# Patient Record
Sex: MALE | Race: WHITE | NOT HISPANIC OR LATINO | Employment: FULL TIME | URBAN - METROPOLITAN AREA
[De-identification: names, ages, dates, MRNs, and addresses within clinical notes are randomized per-mention and may not be internally consistent; named-entity substitution may affect disease eponyms.]

---

## 2022-04-26 ENCOUNTER — OFFICE VISIT (OUTPATIENT)
Dept: SLEEP MEDICINE | Facility: CLINIC | Age: 62
End: 2022-04-26
Payer: COMMERCIAL

## 2022-04-26 DIAGNOSIS — G47.33 OSA (OBSTRUCTIVE SLEEP APNEA): Primary | ICD-10-CM

## 2022-04-26 DIAGNOSIS — I42.2 HYPERTROPHIC CARDIOMYOPATHY: ICD-10-CM

## 2022-04-26 PROCEDURE — 1159F PR MEDICATION LIST DOCUMENTED IN MEDICAL RECORD: ICD-10-PCS | Mod: CPTII,95,, | Performed by: INTERNAL MEDICINE

## 2022-04-26 PROCEDURE — 99202 PR OFFICE/OUTPT VISIT, NEW, LEVL II, 15-29 MIN: ICD-10-PCS | Mod: 95,,, | Performed by: INTERNAL MEDICINE

## 2022-04-26 PROCEDURE — 99202 OFFICE O/P NEW SF 15 MIN: CPT | Mod: 95,,, | Performed by: INTERNAL MEDICINE

## 2022-04-26 PROCEDURE — 1159F MED LIST DOCD IN RCRD: CPT | Mod: CPTII,95,, | Performed by: INTERNAL MEDICINE

## 2022-04-26 PROCEDURE — 1160F PR REVIEW ALL MEDS BY PRESCRIBER/CLIN PHARMACIST DOCUMENTED: ICD-10-PCS | Mod: CPTII,95,, | Performed by: INTERNAL MEDICINE

## 2022-04-26 PROCEDURE — 1160F RVW MEDS BY RX/DR IN RCRD: CPT | Mod: CPTII,95,, | Performed by: INTERNAL MEDICINE

## 2022-04-26 RX ORDER — APREMILAST 30 MG/1
TABLET, FILM COATED ORAL 2 TIMES DAILY
COMMUNITY
Start: 2022-03-14 | End: 2022-10-03

## 2022-04-26 RX ORDER — FUROSEMIDE 20 MG/1
TABLET ORAL DAILY PRN
COMMUNITY
Start: 2021-10-01 | End: 2022-09-09 | Stop reason: SDUPTHER

## 2022-04-26 NOTE — PROGRESS NOTES
Subjective:       Patient ID: Clinton Weller is a 61 y.o. male.    Chief Complaint: Sleeping Problem    I had the pleasure of seeing Clinton Weller today, who is a 61 y.o. male that presents with COOPER.    Clinton Weller does not have a CDL.    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    Clinton Weller presents with COOPER that has been going on for 10+ years   I do not have copy of prior sleep studies.   Currently on CPAP machine.   Just got new machine under recall about 2 months ago.   Uses [facility] out of [place], but needs new DME.   Uses CPAP consistently.   I do 1not have copy of prior sleep studies.   Uses [facility].   Thinks current setting 11.5 cm H20.   Previous UPPP/TA    Bedtime when working ranges from 2200 to 2230.   When not working, bedtime ranges from 2200 to 2230.   Sleep latency ranges from 10 to 15 minutes.     Average number of awakenings is 2-3 and return to sleep is quick.   Wake up time when working is 0600 to 0600.   When not working, wake up time is 0600 to 0630.   Patient does feel rested upon awakening.    Clinton Weller consumes approximately 2-3 beverages with caffeine daily.   An average of 1 beverages with alcohol are consumed daily   Medications taken for sleep currently: none  Previous medications taken: none     Clinton Weller does not experience daytime sleepiness.   Naps are taken about 0 times weekly, usually lasting NA to NA minutes.  Clinton currently does operate an automobile.  Clinton Weller does not experience drowsiness when driving.   Patient does not doze off when sedentary.   Clinton Weller does not have auxiliary symptoms of narcolepsy including sleep onset paralysis, hypnagogic hallucinations, sleep attacks and cataplexy  No flowsheet data found.    Clinton Weller has a history of snoring.   Snoring is described as moderate and constant.   Apneic episodes have been noticed during sleep.   A witness to sleep is present.   The patient awakens with mouth dryness.       Clinton Weller does not have symptoms of Restless Legs Syndrome. Nocturnal leg movements have not been noticed.   The patient does not experience sleep related leg cramps.   There is not a history of parasomnia.      Current Outpatient Medications:     apremilast (OTEZLA) 30 mg Tab, , Disp: , Rfl:     furosemide (LASIX) 20 MG tablet, , Disp: , Rfl:      Review of patient's allergies indicates:   Allergen Reactions    Sulfa (sulfonamide antibiotics) Shortness Of Breath    Penicillins Dermatitis and Rash         History reviewed. No pertinent past medical history.    History reviewed. No pertinent surgical history.    History reviewed. No pertinent family history.    Social History     Socioeconomic History    Marital status:    Tobacco Use    Smoking status: Former Smoker    Smokeless tobacco: Never Used           Old medical records.    There were no vitals filed for this visit.           The patient was given open opportunity to ask questions and/or express concerns about treatment plan.   All questions/concerns were discussed.   Driving precautions were provided.     Two patient identifiers used prior to evaluation.    Thank you for referring Clinton Weller for evaluation.             History reviewed. No pertinent past medical history.  History reviewed. No pertinent surgical history.  History reviewed. No pertinent family history.  Social History     Socioeconomic History    Marital status:    Tobacco Use    Smoking status: Former Smoker    Smokeless tobacco: Never Used       Current Outpatient Medications   Medication Sig Dispense Refill    apremilast (OTEZLA) 30 mg Tab       furosemide (LASIX) 20 MG tablet        No current facility-administered medications for this visit.     Review of patient's allergies indicates:   Allergen Reactions    Sulfa (sulfonamide antibiotics) Shortness Of Breath    Penicillins Dermatitis and Rash       Review of Systems    Objective:      There  were no vitals filed for this visit.  Physical Exam    Lab Review: CBC: No results found for: WBC, RBC, HGB, HCT, PLT  BMP: No results found for: GLU, NA, K, CL, CO2, BUN, CREATININE, CALCIUM  Diagnostics Review: Echo: Reviewed     Assessment:       1. Hypertrophic cardiomyopathy        Plan:       Due to listed symptoms, a polysomnogram is recommended and ordered.   Description of procedure given to patient.   If significant Obstructive Sleep Apnea (COOPER) is found during the initial portion of the study, therapy will be initiated with nasal Continuous Positive Airway Pressure (CPAP).   Goals of therapy were discussed, alternative treatments listed and patient agrees to this form of therapy if indicated.   The pathophysiology of COOPER was discussed.   The effects of COOPER on patient's co-morbid conditions and the increased morbidity and/or mortality associated with this condition were reviewed.   The patient was given open opportunity to ask questions and/or express concerns about treatment plan.   All questions/concerns were discussed.   Driving precautions were provided.       Thank you for referring Clinton Wellre for evaluation.       The patient location is: home  The chief complaint leading to consultation is: 11    Visit type: audiovisual    Face to Face time with patient: 11  23 minutes of total time spent on the encounter, which includes face to face time and non-face to face time preparing to see the patient (eg, review of tests), Obtaining and/or reviewing separately obtained history, Documenting clinical information in the electronic or other health record, Independently interpreting results (not separately reported) and communicating results to the patient/family/caregiver, or Care coordination (not separately reported).         Each patient to whom he or she provides medical services by telemedicine is:  (1) informed of the relationship between the physician and patient and the respective role of any  other health care provider with respect to management of the patient; and (2) notified that he or she may decline to receive medical services by telemedicine and may withdraw from such care at any time.    Notes:      PSG 3/2016 showed AHI 86.1  CPAP titration 12/16 showed AHI 0 at 8 cm H20

## 2022-06-06 ENCOUNTER — PATIENT MESSAGE (OUTPATIENT)
Dept: SLEEP MEDICINE | Facility: CLINIC | Age: 62
End: 2022-06-06
Payer: COMMERCIAL

## 2022-06-06 DIAGNOSIS — G47.33 OSA (OBSTRUCTIVE SLEEP APNEA): Primary | ICD-10-CM

## 2022-06-15 ENCOUNTER — PATIENT MESSAGE (OUTPATIENT)
Dept: SLEEP MEDICINE | Facility: CLINIC | Age: 62
End: 2022-06-15
Payer: COMMERCIAL

## 2022-07-01 ENCOUNTER — OFFICE VISIT (OUTPATIENT)
Dept: TRANSPLANT | Facility: CLINIC | Age: 62
End: 2022-07-01
Payer: COMMERCIAL

## 2022-07-01 VITALS
HEART RATE: 75 BPM | BODY MASS INDEX: 26.42 KG/M2 | HEIGHT: 75 IN | DIASTOLIC BLOOD PRESSURE: 69 MMHG | SYSTOLIC BLOOD PRESSURE: 136 MMHG | WEIGHT: 212.5 LBS

## 2022-07-01 DIAGNOSIS — I42.2 HYPERTROPHIC CARDIOMYOPATHY: Primary | ICD-10-CM

## 2022-07-01 PROCEDURE — 3078F DIAST BP <80 MM HG: CPT | Mod: CPTII,S$GLB,, | Performed by: INTERNAL MEDICINE

## 2022-07-01 PROCEDURE — 99999 PR PBB SHADOW E&M-EST. PATIENT-LVL III: ICD-10-PCS | Mod: PBBFAC,,, | Performed by: INTERNAL MEDICINE

## 2022-07-01 PROCEDURE — 99499 UNLISTED E&M SERVICE: CPT | Mod: S$GLB,,, | Performed by: INTERNAL MEDICINE

## 2022-07-01 PROCEDURE — 99999 PR PBB SHADOW E&M-EST. PATIENT-LVL III: CPT | Mod: PBBFAC,,, | Performed by: INTERNAL MEDICINE

## 2022-07-01 PROCEDURE — 3008F PR BODY MASS INDEX (BMI) DOCUMENTED: ICD-10-PCS | Mod: CPTII,S$GLB,, | Performed by: INTERNAL MEDICINE

## 2022-07-01 PROCEDURE — 99499 NO LOS: ICD-10-PCS | Mod: S$GLB,,, | Performed by: INTERNAL MEDICINE

## 2022-07-01 PROCEDURE — 3075F PR MOST RECENT SYSTOLIC BLOOD PRESS GE 130-139MM HG: ICD-10-PCS | Mod: CPTII,S$GLB,, | Performed by: INTERNAL MEDICINE

## 2022-07-01 PROCEDURE — 3008F BODY MASS INDEX DOCD: CPT | Mod: CPTII,S$GLB,, | Performed by: INTERNAL MEDICINE

## 2022-07-01 PROCEDURE — 3078F PR MOST RECENT DIASTOLIC BLOOD PRESSURE < 80 MM HG: ICD-10-PCS | Mod: CPTII,S$GLB,, | Performed by: INTERNAL MEDICINE

## 2022-07-01 PROCEDURE — 3075F SYST BP GE 130 - 139MM HG: CPT | Mod: CPTII,S$GLB,, | Performed by: INTERNAL MEDICINE

## 2022-08-05 ENCOUNTER — OFFICE VISIT (OUTPATIENT)
Dept: DERMATOLOGY | Facility: CLINIC | Age: 62
End: 2022-08-05
Payer: COMMERCIAL

## 2022-08-05 ENCOUNTER — TELEPHONE (OUTPATIENT)
Dept: DERMATOLOGY | Facility: CLINIC | Age: 62
End: 2022-08-05

## 2022-08-05 DIAGNOSIS — Z79.899 LONG-TERM USE OF HIGH-RISK MEDICATION: ICD-10-CM

## 2022-08-05 DIAGNOSIS — L40.0 PSORIASIS VULGARIS: Primary | ICD-10-CM

## 2022-08-05 DIAGNOSIS — L40.50 PSORIATIC ARTHRITIS: ICD-10-CM

## 2022-08-05 PROBLEM — Z15.89 HETEROZYGOUS MTHFR MUTATION A1298C: Status: ACTIVE | Noted: 2019-09-18

## 2022-08-05 PROBLEM — R79.89 ELEVATED HOMOCYSTEINE: Status: ACTIVE | Noted: 2019-09-18

## 2022-08-05 PROBLEM — E88.89 COENZYME Q DEFICIENCY: Status: ACTIVE | Noted: 2019-09-18

## 2022-08-05 PROCEDURE — 1160F RVW MEDS BY RX/DR IN RCRD: CPT | Mod: CPTII,95,, | Performed by: DERMATOLOGY

## 2022-08-05 PROCEDURE — 1159F MED LIST DOCD IN RCRD: CPT | Mod: CPTII,95,, | Performed by: DERMATOLOGY

## 2022-08-05 PROCEDURE — 99204 PR OFFICE/OUTPT VISIT, NEW, LEVL IV, 45-59 MIN: ICD-10-PCS | Mod: 95,,, | Performed by: DERMATOLOGY

## 2022-08-05 PROCEDURE — 1159F PR MEDICATION LIST DOCUMENTED IN MEDICAL RECORD: ICD-10-PCS | Mod: CPTII,95,, | Performed by: DERMATOLOGY

## 2022-08-05 PROCEDURE — 1160F PR REVIEW ALL MEDS BY PRESCRIBER/CLIN PHARMACIST DOCUMENTED: ICD-10-PCS | Mod: CPTII,95,, | Performed by: DERMATOLOGY

## 2022-08-05 PROCEDURE — 99204 OFFICE O/P NEW MOD 45 MIN: CPT | Mod: 95,,, | Performed by: DERMATOLOGY

## 2022-08-05 RX ORDER — APREMILAST 30 MG/1
TABLET, FILM COATED ORAL
Qty: 60 TABLET | Refills: 2 | Status: SHIPPED | OUTPATIENT
Start: 2022-08-05 | End: 2022-09-08

## 2022-08-05 RX ORDER — TAMSULOSIN HYDROCHLORIDE 0.4 MG/1
1 CAPSULE ORAL DAILY
COMMUNITY
Start: 2022-04-11 | End: 2022-09-08

## 2022-08-05 RX ORDER — CLOBETASOL PROPIONATE 0.5 MG/G
CREAM TOPICAL
COMMUNITY
Start: 2022-03-14

## 2022-08-05 NOTE — TELEPHONE ENCOUNTER
----- Message from Marilin Rodriguez MD sent at 8/5/2022  8:32 AM CDT -----  Please call pt to schedule labs (quant gold too)

## 2022-08-05 NOTE — PROGRESS NOTES
"The patient location is: home  The chief complaint leading to consultation is: psoriasis  Visit type: virtual visit with synchronous audio and video  Total time spent with patient: 18 minutes  Each patient to whom I provide medical services by telemedicine is:  (1) informed of the relationship between the physician and patient and the respective role of any other health care provider with respect to management of the patient; and (2) notified that he or she may decline to receive medical services by telemedicine and may withdraw from such care at any time.    Patient Information  Name: Clinton Weller  : 1960  MRN: [id number]     Referring Physician:  No ref. provider found   Primary Care Physician:  Primary Doctor No   Date of Visit: 2022      Subjective:     History of Present lllness:    Clinton Weller is a 62 y.o. male who presents with a chief complaint of psoriasis.    Location: scalp, forehead, right shoulder, left shoulder, left arm, right arm, chest, left leg, right leg  Duration: several years (since )  Signs/Symptoms: crusting, dryness, irritated, non-healing, scabs, scaling, joint pain  Relieving factors/Prior treatments: No relief from Rx topical steroids; has been on Otezla for 3 months--"huge improvement"    Clinical documentation obtained by nursing staff reviewed.    Review of Systems   Gastrointestinal: Negative for nausea, vomiting and diarrhea.   Musculoskeletal: Positive for arthralgias (joints feel a little stiff in the a.m., clears up with light movement). Negative for joint swelling.   Skin: Positive for itching and rash.   Psychiatric/Behavioral: Negative for depressed mood.       Objective:   Physical Exam   Constitutional: He appears well-developed and well-nourished. No distress.   Neurological: He is alert and oriented to person, place, and time. He is not disoriented.   Psychiatric: He has a normal mood and affect.   Skin:   Areas Examined (abnormalities noted in " diagram):   Head / Face Inspection Performed  Neck Inspection Performed  Chest / Axilla Inspection Performed       Diagram Legend     Erythematous scaling macule/papule c/w actinic keratosis       Vascular papule c/w angioma      Pigmented verrucoid papule/plaque c/w seborrheic keratosis      Yellow umbilicated papule c/w sebaceous hyperplasia      Irregularly shaped tan macule c/w lentigo     1-2 mm smooth white papules consistent with Milia      Movable subcutaneous cyst with punctum c/w epidermal inclusion cyst      Subcutaneous movable cyst c/w pilar cyst      Firm pink to brown papule c/w dermatofibroma      Pedunculated fleshy papule(s) c/w skin tag(s)      Evenly pigmented macule c/w junctional nevus     Mildly variegated pigmented, slightly irregular-bordered macule c/w mildly atypical nevus      Flesh colored to evenly pigmented papule c/w intradermal nevus       Pink pearly papule/plaque c/w basal cell carcinoma      Erythematous hyperkeratotic cursted plaque c/w SCC      Surgical scar with no sign of skin cancer recurrence      Open and closed comedones      Inflammatory papules and pustules      Verrucoid papule consistent consistent with wart     Erythematous eczematous patches and plaques     Dystrophic onycholytic nail with subungual debris c/w onychomycosis     Umbilicated papule    Erythematous-base heme-crusted tan verrucoid plaque consistent with inflamed seborrheic keratosis     Erythematous Silvery Scaling Plaque c/w Psoriasis     See annotation          [] Data reviewed  [] Prior external notes reviewed  [] Independent review of test  [] Management discussed with another provider  [] Independent historian    Assessment / Plan:        Psoriasis vulgaris  Long-term use of high-risk medication   - stable and chronic  Risks and benefits of Otezla include but are not limited to nausea, diarrhea, headaches, tension headaches, weight loss, depression, and upper respiratory infections.   Otezla can be  taken with or without food.   Tablets should not be split, crushed, or chewed.   Continue dosage of 30 mg po bid.   If there are any changes, patient is to notify our office.     Check labs:  -     CBC Auto Differential; Future; Expected date: 08/05/2022  -     Comprehensive Metabolic Panel; Future; Expected date: 08/05/2022  -     Quantiferon Gold TB; Future; Expected date: 08/05/2022  -     Rheumatoid Factor; Future; Expected date: 08/05/2022    -     OTEZLA 30 mg Tab; Take 1 po bid  Dispense: 60 tablet; Refill: 2    Psoriatic arthritis   - stable and chronic  -     CBC Auto Differential; Future; Expected date: 08/05/2022  -     Comprehensive Metabolic Panel; Future; Expected date: 08/05/2022  -     Quantiferon Gold TB; Future; Expected date: 08/05/2022  -     Rheumatoid Factor; Future; Expected date: 08/05/2022  -     OTEZLA 30 mg Tab; Take 1 po bid  Dispense: 60 tablet; Refill: 2      Follow up in about 3 months (around 11/5/2022).      Marilin Rodriguez MD, FAAD  [facility]

## 2022-08-29 ENCOUNTER — LAB VISIT (OUTPATIENT)
Dept: LAB | Facility: HOSPITAL | Age: 62
End: 2022-08-29
Attending: DERMATOLOGY
Payer: COMMERCIAL

## 2022-08-29 DIAGNOSIS — Z79.899 LONG-TERM USE OF HIGH-RISK MEDICATION: ICD-10-CM

## 2022-08-29 DIAGNOSIS — L40.0 PSORIASIS VULGARIS: ICD-10-CM

## 2022-08-29 LAB
ALBUMIN SERPL BCP-MCNC: 4.6 G/DL (ref 3.5–5.2)
ALP SERPL-CCNC: 65 U/L (ref 55–135)
ALT SERPL W/O P-5'-P-CCNC: 40 U/L (ref 10–44)
ANION GAP SERPL CALC-SCNC: 14 MMOL/L (ref 8–16)
AST SERPL-CCNC: 43 U/L (ref 10–40)
BASOPHILS # BLD AUTO: 0.03 K/UL (ref 0–0.2)
BASOPHILS NFR BLD: 0.5 % (ref 0–1.9)
BILIRUB SERPL-MCNC: 3.3 MG/DL (ref 0.1–1)
BUN SERPL-MCNC: 12 MG/DL (ref 6–20)
CALCIUM SERPL-MCNC: 10.1 MG/DL (ref 8.7–10.5)
CHLORIDE SERPL-SCNC: 106 MMOL/L (ref 95–110)
CO2 SERPL-SCNC: 22 MMOL/L (ref 23–29)
CREAT SERPL-MCNC: 1.1 MG/DL (ref 0.5–1.4)
DIFFERENTIAL METHOD: ABNORMAL
EOSINOPHIL # BLD AUTO: 0.2 K/UL (ref 0–0.5)
EOSINOPHIL NFR BLD: 3.7 % (ref 0–8)
ERYTHROCYTE [DISTWIDTH] IN BLOOD BY AUTOMATED COUNT: 12.3 % (ref 11.5–14.5)
EST. GFR  (NO RACE VARIABLE): >60 ML/MIN/1.73 M^2
GLUCOSE SERPL-MCNC: 95 MG/DL (ref 70–110)
HCT VFR BLD AUTO: 51.7 % (ref 40–54)
HGB BLD-MCNC: 17.5 G/DL (ref 14–18)
IMM GRANULOCYTES # BLD AUTO: 0.01 K/UL (ref 0–0.04)
IMM GRANULOCYTES NFR BLD AUTO: 0.2 % (ref 0–0.5)
LYMPHOCYTES # BLD AUTO: 1.8 K/UL (ref 1–4.8)
LYMPHOCYTES NFR BLD: 29.9 % (ref 18–48)
MCH RBC QN AUTO: 31.4 PG (ref 27–31)
MCHC RBC AUTO-ENTMCNC: 33.8 G/DL (ref 32–36)
MCV RBC AUTO: 93 FL (ref 82–98)
MONOCYTES # BLD AUTO: 0.3 K/UL (ref 0.3–1)
MONOCYTES NFR BLD: 4.9 % (ref 4–15)
NEUTROPHILS # BLD AUTO: 3.6 K/UL (ref 1.8–7.7)
NEUTROPHILS NFR BLD: 60.8 % (ref 38–73)
NRBC BLD-RTO: 0 /100 WBC
PLATELET # BLD AUTO: 202 K/UL (ref 150–450)
PMV BLD AUTO: 11.4 FL (ref 9.2–12.9)
POTASSIUM SERPL-SCNC: 3.9 MMOL/L (ref 3.5–5.1)
PROT SERPL-MCNC: 7.7 G/DL (ref 6–8.4)
RBC # BLD AUTO: 5.58 M/UL (ref 4.6–6.2)
RHEUMATOID FACT SERPL-ACNC: <13 IU/ML (ref 0–15)
SODIUM SERPL-SCNC: 142 MMOL/L (ref 136–145)
WBC # BLD AUTO: 5.88 K/UL (ref 3.9–12.7)

## 2022-08-29 PROCEDURE — 36415 COLL VENOUS BLD VENIPUNCTURE: CPT | Mod: PO | Performed by: DERMATOLOGY

## 2022-08-29 PROCEDURE — 85025 COMPLETE CBC W/AUTO DIFF WBC: CPT | Performed by: DERMATOLOGY

## 2022-08-29 PROCEDURE — 86431 RHEUMATOID FACTOR QUANT: CPT | Performed by: DERMATOLOGY

## 2022-08-29 PROCEDURE — 80053 COMPREHEN METABOLIC PANEL: CPT | Performed by: DERMATOLOGY

## 2022-08-29 PROCEDURE — 86480 TB TEST CELL IMMUN MEASURE: CPT | Performed by: DERMATOLOGY

## 2022-08-31 LAB
GAMMA INTERFERON BACKGROUND BLD IA-ACNC: 0 IU/ML
M TB IFN-G CD4+ BCKGRND COR BLD-ACNC: 0.04 IU/ML
MITOGEN IGNF BCKGRD COR BLD-ACNC: 10 IU/ML
TB GOLD PLUS: NEGATIVE
TB2 - NIL: 0.05 IU/ML

## 2022-09-07 ENCOUNTER — OFFICE VISIT (OUTPATIENT)
Dept: FAMILY MEDICINE | Facility: CLINIC | Age: 62
End: 2022-09-07
Attending: FAMILY MEDICINE
Payer: COMMERCIAL

## 2022-09-07 VITALS — BODY MASS INDEX: 26.79 KG/M2 | WEIGHT: 215.5 LBS | RESPIRATION RATE: 16 BRPM | HEIGHT: 75 IN

## 2022-09-07 DIAGNOSIS — Z00.00 ANNUAL PHYSICAL EXAM: Primary | ICD-10-CM

## 2022-09-07 DIAGNOSIS — I42.2 HYPERTROPHIC CARDIOMYOPATHY: ICD-10-CM

## 2022-09-07 DIAGNOSIS — I10 ESSENTIAL HYPERTENSION: ICD-10-CM

## 2022-09-07 DIAGNOSIS — Z12.11 SCREEN FOR COLON CANCER: ICD-10-CM

## 2022-09-07 LAB
BILIRUB UR QL STRIP: NEGATIVE
CLARITY UR REFRACT.AUTO: CLEAR
COLOR UR AUTO: YELLOW
GLUCOSE UR QL STRIP: NEGATIVE
HGB UR QL STRIP: NEGATIVE
KETONES UR QL STRIP: NEGATIVE
LEUKOCYTE ESTERASE UR QL STRIP: NEGATIVE
NITRITE UR QL STRIP: NEGATIVE
PH UR STRIP: 7 [PH] (ref 5–8)
PROT UR QL STRIP: NEGATIVE
SP GR UR STRIP: 1.01 (ref 1–1.03)
URN SPEC COLLECT METH UR: NORMAL

## 2022-09-07 PROCEDURE — 90471 TDAP VACCINE GREATER THAN OR EQUAL TO 7YO IM: ICD-10-PCS | Mod: S$GLB,,, | Performed by: FAMILY MEDICINE

## 2022-09-07 PROCEDURE — 90715 TDAP VACCINE 7 YRS/> IM: CPT | Mod: S$GLB,,, | Performed by: FAMILY MEDICINE

## 2022-09-07 PROCEDURE — 99386 PREV VISIT NEW AGE 40-64: CPT | Mod: 25,S$GLB,, | Performed by: FAMILY MEDICINE

## 2022-09-07 PROCEDURE — 90471 IMMUNIZATION ADMIN: CPT | Mod: S$GLB,,, | Performed by: FAMILY MEDICINE

## 2022-09-07 PROCEDURE — 3008F BODY MASS INDEX DOCD: CPT | Mod: CPTII,S$GLB,, | Performed by: FAMILY MEDICINE

## 2022-09-07 PROCEDURE — 81003 URINALYSIS AUTO W/O SCOPE: CPT | Performed by: FAMILY MEDICINE

## 2022-09-07 PROCEDURE — 90715 TDAP VACCINE GREATER THAN OR EQUAL TO 7YO IM: ICD-10-PCS | Mod: S$GLB,,, | Performed by: FAMILY MEDICINE

## 2022-09-07 PROCEDURE — 99386 PR PREVENTIVE VISIT,NEW,40-64: ICD-10-PCS | Mod: 25,S$GLB,, | Performed by: FAMILY MEDICINE

## 2022-09-07 PROCEDURE — 99999 PR PBB SHADOW E&M-EST. PATIENT-LVL III: CPT | Mod: PBBFAC,,, | Performed by: FAMILY MEDICINE

## 2022-09-07 PROCEDURE — 99999 PR PBB SHADOW E&M-EST. PATIENT-LVL III: ICD-10-PCS | Mod: PBBFAC,,, | Performed by: FAMILY MEDICINE

## 2022-09-07 PROCEDURE — 3008F PR BODY MASS INDEX (BMI) DOCUMENTED: ICD-10-PCS | Mod: CPTII,S$GLB,, | Performed by: FAMILY MEDICINE

## 2022-09-07 NOTE — PATIENT INSTRUCTIONS
Clinton,     We are always striving for excellence. Should you receive a patient experience survey via text message, electronically, or by mail, we would appreciate if you would take a few moments to give us your feedback. These surveys let us know our strengths as well as areas of opportunity for improvement to better serve you.    Thank you for your time,  Deepika Rajput LPN      Your test results will be communicated to you via : [facility], Telephone or Letter.   If you have not received your test results in one week, please contact the clinic at [phone].

## 2022-09-07 NOTE — PROGRESS NOTES
Two patient identifiers verified. Allergies reviewed.  Tdap IM administered to Left deltoid per MD order. Patient tolerated injection well: no redness, bleeding, or bruising noted to injection site. Patient instructed to remain in clinic setting for 15 minutes.

## 2022-09-08 ENCOUNTER — CLINICAL SUPPORT (OUTPATIENT)
Dept: AUDIOLOGY | Facility: CLINIC | Age: 62
End: 2022-09-08
Payer: COMMERCIAL

## 2022-09-08 ENCOUNTER — OFFICE VISIT (OUTPATIENT)
Dept: OTOLARYNGOLOGY | Facility: CLINIC | Age: 62
End: 2022-09-08
Payer: COMMERCIAL

## 2022-09-08 VITALS — HEART RATE: 55 BPM | SYSTOLIC BLOOD PRESSURE: 125 MMHG | DIASTOLIC BLOOD PRESSURE: 80 MMHG

## 2022-09-08 DIAGNOSIS — H90.3 SENSORINEURAL HEARING LOSS (SNHL) OF BOTH EARS: ICD-10-CM

## 2022-09-08 DIAGNOSIS — H90.3 SENSORINEURAL HEARING LOSS, BILATERAL: Primary | ICD-10-CM

## 2022-09-08 DIAGNOSIS — H93.13 TINNITUS OF BOTH EARS: Primary | ICD-10-CM

## 2022-09-08 DIAGNOSIS — H93.13 TINNITUS, BILATERAL: ICD-10-CM

## 2022-09-08 PROCEDURE — 3079F DIAST BP 80-89 MM HG: CPT | Mod: CPTII,S$GLB,, | Performed by: NURSE PRACTITIONER

## 2022-09-08 PROCEDURE — 92567 TYMPANOMETRY: CPT | Mod: S$GLB,,, | Performed by: AUDIOLOGIST

## 2022-09-08 PROCEDURE — 99999 PR PBB SHADOW E&M-EST. PATIENT-LVL II: CPT | Mod: PBBFAC,,, | Performed by: NURSE PRACTITIONER

## 2022-09-08 PROCEDURE — 3074F PR MOST RECENT SYSTOLIC BLOOD PRESSURE < 130 MM HG: ICD-10-PCS | Mod: CPTII,S$GLB,, | Performed by: NURSE PRACTITIONER

## 2022-09-08 PROCEDURE — 99999 PR PBB SHADOW E&M-EST. PATIENT-LVL I: CPT | Mod: PBBFAC,,, | Performed by: AUDIOLOGIST

## 2022-09-08 PROCEDURE — 99203 OFFICE O/P NEW LOW 30 MIN: CPT | Mod: S$GLB,,, | Performed by: NURSE PRACTITIONER

## 2022-09-08 PROCEDURE — 92557 PR COMPREHENSIVE HEARING TEST: ICD-10-PCS | Mod: S$GLB,,, | Performed by: AUDIOLOGIST

## 2022-09-08 PROCEDURE — 3079F PR MOST RECENT DIASTOLIC BLOOD PRESSURE 80-89 MM HG: ICD-10-PCS | Mod: CPTII,S$GLB,, | Performed by: NURSE PRACTITIONER

## 2022-09-08 PROCEDURE — 1159F MED LIST DOCD IN RCRD: CPT | Mod: CPTII,S$GLB,, | Performed by: NURSE PRACTITIONER

## 2022-09-08 PROCEDURE — 3074F SYST BP LT 130 MM HG: CPT | Mod: CPTII,S$GLB,, | Performed by: NURSE PRACTITIONER

## 2022-09-08 PROCEDURE — 99203 PR OFFICE/OUTPT VISIT, NEW, LEVL III, 30-44 MIN: ICD-10-PCS | Mod: S$GLB,,, | Performed by: NURSE PRACTITIONER

## 2022-09-08 PROCEDURE — 99999 PR PBB SHADOW E&M-EST. PATIENT-LVL I: ICD-10-PCS | Mod: PBBFAC,,, | Performed by: AUDIOLOGIST

## 2022-09-08 PROCEDURE — 92567 PR TYMPA2METRY: ICD-10-PCS | Mod: S$GLB,,, | Performed by: AUDIOLOGIST

## 2022-09-08 PROCEDURE — 1159F PR MEDICATION LIST DOCUMENTED IN MEDICAL RECORD: ICD-10-PCS | Mod: CPTII,S$GLB,, | Performed by: NURSE PRACTITIONER

## 2022-09-08 PROCEDURE — 92557 COMPREHENSIVE HEARING TEST: CPT | Mod: S$GLB,,, | Performed by: AUDIOLOGIST

## 2022-09-08 PROCEDURE — 99999 PR PBB SHADOW E&M-EST. PATIENT-LVL II: ICD-10-PCS | Mod: PBBFAC,,, | Performed by: NURSE PRACTITIONER

## 2022-09-08 NOTE — PROGRESS NOTES
Clinton Weller, a 62 y.o. male, was seen today in the clinic for an audiologic evaluation.  Patients main complaint was tinnitus.  Mr. Weller reported that he is experiencing bilateral tinnitus that is constant in nature and has been present for several years.  He denied otalgia, aural fullness, and vertigo.    Tympanometry revealed Type A in the right ear and Type A in the left ear. Audiogram results revealed normal sloping to severe sensorineural hearing loss (SNHL) in the right ear and normal sloping to severe SNHL in the left ear.  Speech reception thresholds were noted at 15 dB in the right ear and 15 dB in the left ear.  Speech discrimination scores were 100% in the right ear and 100% in the left ear.    Recommendations:  Otologic evaluation  Annual audiogram  Hearing protection when in noise

## 2022-09-08 NOTE — PROGRESS NOTES
Subjective:      Clinton Weller is a 62 y.o. male who was self-referred for  hearing loss and tinnitus .     Mr. Weller presents to clinic for a hearing evaluation.  He reports a long history of bilateral, constant tonal tinnitus.  He notes that in 2016 he went into cardiac arrest and was shocked by his defibrillator.  After that event the tinnitus became distinctly worse.  His cardiologist suspects this was do to decreased oxygenation.  He reports that his hearing seems decent, although he struggles in loud areas such as restaurants.  He denies any otalgia, otorrhea, ear fullness/pressure, or vertigo.  He denies any ASA/NSAID use.  Minimal coffee consumption.  Does not smokes and denies migraines or recent head trauma.     There is not a family history of hearing loss at a young age.  There is not a prior history of ear surgery.  There is not a prior history of ear infections.  There is not a history of ear trauma.  He admits to a history of significant noise exposure- played in a band, used firearms, and worked around farm machinery.  He does not wear hearing aids currently.  He has not had a hearing test recently.      Past Medical History  He has no past medical history on file.    Past Surgical History  He has no past surgical history on file.    Family History  His family history is not on file.    Social History  He reports that he has quit smoking. He has never used smokeless tobacco.    Allergies  He is allergic to penicillins and sulfa (sulfonamide antibiotics).    Medications  He has a current medication list which includes the following prescription(s): otezla, clobetasol, furosemide, otezla, and tamsulosin.    Review of Systems     Constitutional: Negative for appetite change, chills, fatigue, fever and unexpected weight loss.      HENT: Positive for ear discharge, hearing loss and voice change.      Eyes:  Negative for change in eyesight, eye drainage, eye itching and photophobia.      Respiratory:  Positive for shortness of breath and sleep apnea.      Cardiovascular:  Positive for chest pain, foot swelling and irregular heartbeat.     Gastrointestinal:  Positive for constipation.     Genitourinary: Negative for difficulty urinating, sexual problems and frequent urination.     Musc: Positive for back pain.     Skin: Positive for rash.     Allergy: Negative for food allergies and seasonal allergies.     Endocrine: Negative for cold intolerance and heat intolerance.      Neurological: Positive for dizziness and light-headedness.     Hematologic: Negative for bruises/bleeds easily and swollen glands.      Psychiatric: Negative for decreased concentration, depression, nervous/anxious and sleep disturbance.        Objective:   /80   Pulse (!) 55      Constitutional:   He is oriented to person, place, and time. He appears well-developed and well-nourished. He appears alert. He is cooperative.  Non-toxic appearance. He does not have a sickly appearance. He does not appear ill. Normal speech.      Head:  Normocephalic and atraumatic. Not macrocephalic and not microcephalic. Head is without raccoon's eyes, without Alvarez's sign, without abrasion, without laceration, without right periorbital erythema, without left periorbital erythema and without TMJ tenderness.     Ears:    Right Ear: No lacerations. No drainage, swelling or tenderness. No foreign bodies. No mastoid tenderness. Tympanic membrane is not injected, not scarred, not perforated, not erythematous, not retracted and not bulging. No middle ear effusion. No hemotympanum.   Left Ear: No lacerations. No drainage, swelling or tenderness. No foreign bodies. No mastoid tenderness. Tympanic membrane is not injected, not scarred, not perforated, not erythematous, not retracted and not bulging.  No middle ear effusion. No hemotympanum.     Pulmonary/Chest:   Effort normal.     Psychiatric:   He has a normal mood and affect. His speech is  normal and behavior is normal.     Neurological:   He is alert and oriented to person, place, and time. Coordination and gait normal.   Procedure  None    Data Reviewed  WBC (K/uL)   Date Value   08/29/2022 5.88     Eosinophil % (%)   Date Value   08/29/2022 3.7     Eos # (K/uL)   Date Value   08/29/2022 0.2     Platelets (K/uL)   Date Value   08/29/2022 202     Glucose (mg/dL)   Date Value   08/29/2022 95     No results found for: IGE    Imaging  No pertinent imaging available.    Audiogram    I independently reviewed the tracings of the complete audiometric evaluation performed today.  I reviewed the audiogram with the patient as well.  Pertinent findings include binaural sloping HF sensorineural hearing loss with normal tymps.  Assessment:     1. Tinnitus of both ears    2. Sensorineural hearing loss (SNHL) of both ears      Plan:     Tinnitus of both ears  Discussed the etiology of tinnitus and management strategies including the use of background sound enrichment. He was further instructed that avoidance of caffeine, alcohol, tobacco, and stress can be of benefit.  Reassurance was given to the patient.      Sensorineural hearing loss (SNHL) of both ears  Audiometric testing interpretation consistent with sensorineural hearing loss.  Discussed the etiology of SNHL.  At this time HA are not indicated, but will monitor hearing annually.  Hearing conservation in noisy environments.  Return to clinic every year for audiometric testing.

## 2022-09-09 ENCOUNTER — HOSPITAL ENCOUNTER (OUTPATIENT)
Dept: CARDIOLOGY | Facility: HOSPITAL | Age: 62
Discharge: HOME | End: 2022-09-09
Attending: INTERNAL MEDICINE
Payer: COMMERCIAL

## 2022-09-09 ENCOUNTER — OFFICE VISIT (OUTPATIENT)
Dept: TRANSPLANT | Facility: CLINIC | Age: 62
End: 2022-09-09
Payer: COMMERCIAL

## 2022-09-09 VITALS
HEIGHT: 75 IN | HEART RATE: 50 BPM | SYSTOLIC BLOOD PRESSURE: 125 MMHG | BODY MASS INDEX: 26.36 KG/M2 | DIASTOLIC BLOOD PRESSURE: 80 MMHG | WEIGHT: 212 LBS

## 2022-09-09 VITALS
WEIGHT: 216.69 LBS | DIASTOLIC BLOOD PRESSURE: 75 MMHG | HEIGHT: 75 IN | BODY MASS INDEX: 26.94 KG/M2 | HEART RATE: 73 BPM | SYSTOLIC BLOOD PRESSURE: 144 MMHG

## 2022-09-09 DIAGNOSIS — I42.1 HYPERTROPHIC OBSTRUCTIVE CARDIOMYOPATHY (HOCM): ICD-10-CM

## 2022-09-09 DIAGNOSIS — I42.2 HYPERTROPHIC CARDIOMYOPATHY: ICD-10-CM

## 2022-09-09 DIAGNOSIS — Z95.810 IMPLANTABLE CARDIOVERTER-DEFIBRILLATOR (ICD) IN SITU: ICD-10-CM

## 2022-09-09 DIAGNOSIS — I42.2 HYPERTROPHIC CARDIOMYOPATHY: Primary | ICD-10-CM

## 2022-09-09 LAB
ASCENDING AORTA: 3.98 CM
AV INDEX (PROSTH): 0.81
AV MEAN GRADIENT: 3 MMHG
AV PEAK GRADIENT: 5 MMHG
AV VALVE AREA: 4 CM2
AV VELOCITY RATIO: 0.85
BSA FOR ECHO PROCEDURE: 2.26 M2
CV ECHO LV RWT: 0.42 CM
DOP CALC AO PEAK VEL: 1.15 M/S
DOP CALC AO VTI: 31.96 CM
DOP CALC LVOT AREA: 4.9 CM2
DOP CALC LVOT DIAMETER: 2.51 CM
DOP CALC LVOT PEAK VEL: 0.98 M/S
DOP CALC LVOT STROKE VOLUME: 127.94 CM3
DOP CALCLVOT PEAK VEL VTI: 25.87 CM
E WAVE DECELERATION TIME: 253.11 MSEC
E/A RATIO: 0.62
E/E' RATIO: 8.73 M/S
ECHO LV POSTERIOR WALL: 1.05 CM (ref 0.6–1.1)
EJECTION FRACTION: 58 %
FRACTIONAL SHORTENING: 22 % (ref 28–44)
INTERVENTRICULAR SEPTUM: 1.05 CM (ref 0.6–1.1)
LA MAJOR: 4.72 CM
LA MINOR: 4.85 CM
LA WIDTH: 3.63 CM
LEFT ATRIUM SIZE: 4 CM
LEFT ATRIUM VOLUME INDEX MOD: 21.9 ML/M2
LEFT ATRIUM VOLUME INDEX: 26.2 ML/M2
LEFT ATRIUM VOLUME MOD: 49.19 CM3
LEFT ATRIUM VOLUME: 59.05 CM3
LEFT INTERNAL DIMENSION IN SYSTOLE: 3.92 CM (ref 2.1–4)
LEFT VENTRICLE DIASTOLIC VOLUME INDEX: 52.55 ML/M2
LEFT VENTRICLE DIASTOLIC VOLUME: 118.23 ML
LEFT VENTRICLE MASS INDEX: 86 G/M2
LEFT VENTRICLE SYSTOLIC VOLUME INDEX: 29.7 ML/M2
LEFT VENTRICLE SYSTOLIC VOLUME: 66.9 ML
LEFT VENTRICULAR INTERNAL DIMENSION IN DIASTOLE: 5 CM (ref 3.5–6)
LEFT VENTRICULAR MASS: 194.38 G
LV LATERAL E/E' RATIO: 8 M/S
LV SEPTAL E/E' RATIO: 9.6 M/S
MV A" WAVE DURATION": 17.89 MSEC
MV PEAK A VEL: 0.77 M/S
MV PEAK E VEL: 0.48 M/S
MV STENOSIS PRESSURE HALF TIME: 73.4 MS
MV VALVE AREA P 1/2 METHOD: 3 CM2
PISA TR MAX VEL: 2.16 M/S
PULM VEIN S/D RATIO: 1.13
PV PEAK D VEL: 0.32 M/S
PV PEAK S VEL: 0.36 M/S
QEF: 61 %
RA MAJOR: 4.53 CM
RA WIDTH: 3.57 CM
RIGHT VENTRICULAR END-DIASTOLIC DIMENSION: 3.73 CM
RV TISSUE DOPPLER FREE WALL SYSTOLIC VELOCITY 1 (APICAL 4 CHAMBER VIEW): 12.15 CM/S
SINUS: 3.88 CM
STJ: 3.68 CM
TDI LATERAL: 0.06 M/S
TDI SEPTAL: 0.05 M/S
TDI: 0.06 M/S
TR MAX PG: 19 MMHG
TRICUSPID ANNULAR PLANE SYSTOLIC EXCURSION: 2.02 CM

## 2022-09-09 PROCEDURE — 3078F PR MOST RECENT DIASTOLIC BLOOD PRESSURE < 80 MM HG: ICD-10-PCS | Mod: CPTII,S$GLB,, | Performed by: INTERNAL MEDICINE

## 2022-09-09 PROCEDURE — 99204 OFFICE O/P NEW MOD 45 MIN: CPT | Mod: S$GLB,,, | Performed by: INTERNAL MEDICINE

## 2022-09-09 PROCEDURE — 99999 PR PBB SHADOW E&M-EST. PATIENT-LVL III: CPT | Mod: PBBFAC,,, | Performed by: INTERNAL MEDICINE

## 2022-09-09 PROCEDURE — 3008F BODY MASS INDEX DOCD: CPT | Mod: CPTII,S$GLB,, | Performed by: INTERNAL MEDICINE

## 2022-09-09 PROCEDURE — 93306 TTE W/DOPPLER COMPLETE: CPT

## 2022-09-09 PROCEDURE — 99204 PR OFFICE/OUTPT VISIT, NEW, LEVL IV, 45-59 MIN: ICD-10-PCS | Mod: S$GLB,,, | Performed by: INTERNAL MEDICINE

## 2022-09-09 PROCEDURE — 99999 PR PBB SHADOW E&M-EST. PATIENT-LVL III: ICD-10-PCS | Mod: PBBFAC,,, | Performed by: INTERNAL MEDICINE

## 2022-09-09 PROCEDURE — 3077F SYST BP >= 140 MM HG: CPT | Mod: CPTII,S$GLB,, | Performed by: INTERNAL MEDICINE

## 2022-09-09 PROCEDURE — 93306 ECHO (CUPID ONLY): ICD-10-PCS | Mod: 26,,, | Performed by: INTERNAL MEDICINE

## 2022-09-09 PROCEDURE — 3077F PR MOST RECENT SYSTOLIC BLOOD PRESSURE >= 140 MM HG: ICD-10-PCS | Mod: CPTII,S$GLB,, | Performed by: INTERNAL MEDICINE

## 2022-09-09 PROCEDURE — 93306 TTE W/DOPPLER COMPLETE: CPT | Mod: 26,,, | Performed by: INTERNAL MEDICINE

## 2022-09-09 PROCEDURE — 3008F PR BODY MASS INDEX (BMI) DOCUMENTED: ICD-10-PCS | Mod: CPTII,S$GLB,, | Performed by: INTERNAL MEDICINE

## 2022-09-09 PROCEDURE — 3078F DIAST BP <80 MM HG: CPT | Mod: CPTII,S$GLB,, | Performed by: INTERNAL MEDICINE

## 2022-09-09 RX ORDER — FUROSEMIDE 20 MG/1
20 TABLET ORAL DAILY PRN
Qty: 30 TABLET | Refills: 6 | Status: SHIPPED | OUTPATIENT
Start: 2022-09-09 | End: 2023-03-16

## 2022-09-09 NOTE — PATIENT INSTRUCTIONS
You have just the right amount of fluid on you.  Please adhere to a low sodium diet (no more than 1.5 grams of sodium in 24h).  3.   Follow fluid restriction of  1. no more than 2 liters in 24 hours..   4.   Please have holter monitoring set up at your earliest convenience.  5. Follow up in 6 months with labs

## 2022-09-10 NOTE — PROGRESS NOTES
"                                                                                       [facility] Follow up.      Attending Physician: Bubba Osorio MD.  The patient's last visit with me was on 7/1/2022.         HPI.  62 Y male with history of hypertrophic cardiomyopathy, s/p ETOH ablation x 2, s/p VT ablation x2, PPM/ICD and in 2016 two failed shocks, rescued on the third shock for VT/VF.      Patient comes to establish care as he moved recently to [place] area from [place], [place].  He was previously being taken care of at [facility], and before that at [facility] (Alexander Andrew MD / Jesus Matias MD / Clinton Bonilla MD)     HCM Morphology:  Concentric hypertrophy     Risk Stratification:  WT > 3: No  Syncope: Yes  VT: Yes  FH SCD: No  Lack of BP rise with exercise: unknown at this time.   DGE on cMRI: Unknown      History of present illness   Healthy and active childhood but h/o difficulty with exertion and distance, +murmur  2005 diagnosed with HOCM   2005 ETOH ablation at [facility], has a decline in his usually exercise. No medication trial prior.   2006 repeat ETOH ablation, syncope d/t complete AVB and PPM/ICD placed   2008 shock inappropriate for SVT   [id number] felt "great", able to return to his usually moderate-high intensity exercise regimen.   2014 generator change, increase symptom burden, AVNRT ablation     July 2016 - started CCB d/t increase symptoms and soon after had an increase burden of NSVT (patient's report), and then three shocks, first 2 failed and the third rescued, VT/VF. sustained VT of 214 bpm. ATP was not effective an accelerated the VT. First two shocks were not effective. Final shock changed the VT enough for it to terminate. Cath showed no significant CAD. ECHO showed no LVOT gradient. Underwent VT ablation on 7/12/2016. Frequent PVCs localized to the LV septum from a discrete scarred region consistent with his ablated septum. Complete " elimination of PVCs with homogenization of the scar. Highly fractioned signal along the right bundle and the left posterior fascicle was mapped; to prevent the likely occurrence of BBRT or interfascicular VT, the regions of fragmentation were ablated and a true RBBB with LPFB was created. Decreased bipolar sensing from the RV septal ICD lead was observed after ablations. DFT testing was then performed on 7/13/2016. RV lead sensing configuration was changed from bipolar to extended bipolar and appropriate sensing was confirmed during VF induction. DFT was confirmed to be 20 J or less with B>AX configuration with the SVC included in the circuit.     1/2018: First visit with Dr. López. Agree with dx of hypertrophic cardiomyopathy. Phenotype notable for mild concentric hypertrophy, non obstructive, mild odcal apical hypokinesis, grade I diastolic dysfunction. VO2 max of of 26.4 (82% predicted). Flat HR response 68-->97 bpm. Normal BP response. Continue verapamil 120 mg daily since he has felt his symptoms have improved on this dosing. Reprogrammed device to better support his HR response during exercise. Consider BiV ICD given LBBB     1/2019: CPX stable exercise capacity, VE/VCO2 remain elevated, LV appear slightly more dilated, EF down to 57%, discontinued verapamil and switched to metoprolol, plan to repeat TTE in 6 mo and consider BIV upgrade  1/30/19: TTE with increased dilated cavity, made switch from verapamil to metoprolol    1/30/19 CPX:   RER 1.14 HR max 126 (77% predicted) VO2 max 24.3 (79-83% predited) VeVCO2 37.3 Achieved 10 METS  -> 171   LVEF 57%, mild LVH, abdnormal septal motion and mild apical hypokinesis   8/7/29 TTE:   LVEF 55%, mild RVE with normal systolic function, mild aortic root (4.3) and ascending aorta (4.2) diatation   8/7/19: worsening constipation and fatigue with metoprolol and nadalol, switched to verapamil plan for repeat CPX  1/22 Exercise capacity has improved since starting  verapamil. No chest pain, dyspnea or presyncope. CPX showed VO2 max 28 (90s% predicted). LVEF 55%. No med changes. RTC in 1 year.   2021: Increasing fatigue and exercise intolerance, rate response increased with good efect.     Past medical history  COOPER (CPAP)  Obesity  Murmur  Bradycardia      Family history and genetic testing   No genetic testing to date.   Mother is 84 and has a heart murmur since age 15.   Siblings: 3 brothers, no official screening, no symptoms or reported cardiac disease.   Children - Bri (31yo) and Marquise (29yo), unclear if either have had a complete cardiac phenotyping.   No family history of known hypertrophic cardiomyopathy or SCD.      Review of Systems   Constitutional:  Negative for activity change, appetite change, chills, diaphoresis, fatigue, fever and unexpected weight change.   HENT:  Negative for nasal congestion, rhinorrhea and sore throat.    Eyes:  Negative for visual disturbance.   Cardiovascular:  Negative for chest pain, palpitations and leg swelling.   Gastrointestinal:  Negative for abdominal pain, diarrhea, nausea and vomiting.   Genitourinary:  Negative for difficulty urinating, dysuria and hematuria.   Integumentary:  Negative for rash.   Neurological:  Negative for seizures, syncope and light-headedness.   Psychiatric/Behavioral:  Negative for agitation and hallucinations.         Review of patient's allergies indicates:   Allergen Reactions    Penicillins Dermatitis, Rash, Shortness Of Breath, Hives and Other (See Comments)    Sulfa (sulfonamide antibiotics) Shortness Of Breath, Other (See Comments), Rash and Hives     Other reaction(s): Rash, Unspecified  Other reaction(s): Rash        Current Outpatient Medications   Medication Instructions    apremilast (OTEZLA) 30 mg Tab 2 times daily    clobetasoL (TEMOVATE) 0.05 % cream SMARTSI Topical Daily PRN    furosemide (LASIX) 20 mg, Oral, Daily PRN        Vitals:    22 1042   BP: (!) 144/75   Pulse: 73     "    Wt Readings from Last 3 Encounters:   09/09/22 96.2 kg (212 lb)   09/09/22 98.3 kg (216 lb 11.4 oz)   09/07/22 97.8 kg (215 lb 8 oz)     Temp Readings from Last 3 Encounters:   No data found for Temp     BP Readings from Last 3 Encounters:   09/09/22 125/80   09/09/22 (!) 144/75   09/08/22 125/80     Pulse Readings from Last 3 Encounters:   09/09/22 (!) 50   09/09/22 73   09/08/22 (!) 55        Body mass index is 27.09 kg/m². Estimated body surface area is 2.28 meters squared as calculated from the following:    Height as of this encounter: 6' 3" (1.905 m).    Weight as of this encounter: 98.3 kg (216 lb 11.4 oz).     Physical Exam  Constitutional:       Appearance: He is well-developed.   HENT:      Head: Normocephalic and atraumatic.      Right Ear: External ear normal.      Left Ear: External ear normal.   Eyes:      Conjunctiva/sclera: Conjunctivae normal.      Pupils: Pupils are equal, round, and reactive to light.   Neck:      Vascular: No hepatojugular reflux or JVD.   Cardiovascular:      Rate and Rhythm: Normal rate and regular rhythm.      Pulses: Intact distal pulses.      Heart sounds: S1 normal and S2 normal. No murmur heard.    No friction rub. No gallop.   Pulmonary:      Effort: Pulmonary effort is normal.      Breath sounds: Normal breath sounds.   Abdominal:      General: Bowel sounds are normal. There is no distension.      Palpations: Abdomen is soft.      Tenderness: There is no abdominal tenderness. There is no guarding or rebound.   Musculoskeletal:      Cervical back: Normal range of motion and neck supple.      Right lower leg: No edema.      Left lower leg: No edema.   Neurological:      Mental Status: He is alert and oriented to person, place, and time.        Lab Results   Component Value Date     09/09/2022    K 4.2 09/09/2022     09/09/2022    CO2 26 09/09/2022    BUN 10 09/09/2022    CREATININE 1.0 09/09/2022    GLU 79 09/09/2022    AST 27 09/09/2022    ALT 29 " 09/09/2022    ALBUMIN 4.1 09/09/2022    PROT 6.7 09/09/2022    BILITOT 1.7 (H) 09/09/2022    WBC 5.09 09/09/2022    HGB 15.8 09/09/2022    HCT 45.1 09/09/2022     09/09/2022    CHOL 184 09/09/2022    HDL 54 09/09/2022    LDLCALC 114.2 09/09/2022    TRIG 79 09/09/2022           Results for orders placed during the hospital encounter of 09/09/22    Echo    Interpretation Summary  · The left ventricle is normal in size with  · The visually estimated ejection fraction is 58% ( mid to upper 50s but no evidence of HCM).  · The quantitatively derived ejection fraction is 61%.  · Normal left ventricular diastolic function.  · There are segmental left ventricular wall motion abnormalities.  · Normal right ventricular size with normal right ventricular systolic function.  · The ascending aorta is mildly dilated.        No results found for this or any previous visit.         Assessment and Plan:  Hypertrophic cardiomyopathy  -     Holter monitor - 48 hour; Future  -     CBC Auto Differential; Future; Expected date: 09/09/2022  -     Comprehensive Metabolic Panel; Future; Expected date: 09/09/2022  -     NT-Pro Natriuretic Peptide; Future; Expected date: 09/09/2022    Hypertrophic obstructive cardiomyopathy (HOCM)    Implantable cardioverter-defibrillator (ICD) in situ    Other orders  -     furosemide (LASIX) 20 MG tablet; Take 1 tablet (20 mg total) by mouth daily as needed.  Dispense: 30 tablet; Refill: 6        Hypertrophic cardiomyopathy. LVEF 61%.  S/p alcohol ablation for obstructive phenotype.  Resolution of LVOT gradients after 2 procedures.  Residual complications: complete AV block requiring pacemaker function. Scar related VT and ICD shocks. S/p VT ablation.  Currently he is asymptomatic. Takes prn lasix for fluid retention.  Plan:  -Holter monitor once a year to screen for atrial arrhythmias (a. Fib).  -Resting echo once a year.     F/u in 6 months with labs.

## 2022-09-12 ENCOUNTER — PATIENT MESSAGE (OUTPATIENT)
Dept: ADMINISTRATIVE | Facility: HOSPITAL | Age: 62
End: 2022-09-12
Payer: COMMERCIAL

## 2022-09-14 DIAGNOSIS — Z12.11 COLON CANCER SCREENING: ICD-10-CM

## 2022-09-29 NOTE — PROGRESS NOTES
"Subjective:       Patient ID: Clinton Weller is a 62 y.o. male.    Chief Complaint: Establish Care    HPI  Pt is here for annual exam pt is well no sob/cp no change in bowel habits no brbpr  Pt denies dysuria hematuria no acute urinary complaints  Pt denies n/v/f/c/d/c no cough chest congestion no sore throat  Pt has htn heart disease followed in [place] bp fine on lasix no muscle cramps bp fine today   Review of Systems   Constitutional:  Negative for activity change, chills, fatigue and fever.   HENT:  Negative for congestion, ear pain, hearing loss, postnasal drip, rhinorrhea, sinus pressure and sore throat.    Eyes:  Negative for photophobia, pain, redness and visual disturbance.   Respiratory:  Negative for cough, chest tightness and shortness of breath.    Cardiovascular:  Negative for chest pain, palpitations and leg swelling.   Gastrointestinal:  Negative for abdominal pain, blood in stool, constipation, diarrhea, nausea and vomiting.   Genitourinary:  Negative for decreased urine volume, difficulty urinating, dysuria, frequency, penile discharge and urgency.   Musculoskeletal:  Negative for back pain, joint swelling and neck pain.   Skin:  Negative for color change, pallor and rash.   Neurological:  Negative for dizziness, seizures, speech difficulty and numbness.   Hematological:  Does not bruise/bleed easily.   Psychiatric/Behavioral:  Negative for behavioral problems, confusion, decreased concentration and suicidal ideas.      Objective:    Resp 16   Ht 6' 3" (1.905 m)   Wt 97.8 kg (215 lb 8 oz)   BMI 26.94 kg/m²     Physical Exam  Constitutional:       General: He is not in acute distress.     Appearance: Normal appearance. He is well-developed. He is not ill-appearing.   HENT:      Head: Normocephalic and atraumatic.      Right Ear: Tympanic membrane normal.      Left Ear: Tympanic membrane normal.      Nose: Nose normal.   Eyes:      Pupils: Pupils are equal, round, and reactive to light.   Neck: " "     Thyroid: No thyromegaly.   Cardiovascular:      Rate and Rhythm: Normal rate and regular rhythm.      Heart sounds: Normal heart sounds. No murmur heard.    No friction rub. No gallop.   Pulmonary:      Effort: Pulmonary effort is normal. No respiratory distress.      Breath sounds: Normal breath sounds.   Abdominal:      General: Bowel sounds are normal. There is no distension.      Palpations: Abdomen is soft.      Tenderness: There is no abdominal tenderness. There is no guarding or rebound.   Genitourinary:     Comments: declined  Musculoskeletal:         General: No tenderness. Normal range of motion.      Cervical back: Normal range of motion and neck supple.   Skin:     General: Skin is warm and dry.      Findings: No erythema.   Neurological:      Mental Status: He is alert and oriented to person, place, and time.      Cranial Nerves: No cranial nerve deficit.      Coordination: Coordination normal.   Psychiatric:         Behavior: Behavior normal.         Thought Content: Thought content normal.         Judgment: Judgment normal.       Assessment:       1. Annual physical exam    2. Screen for colon cancer          Plan:        Orders cbc cmp lipid tsh urine  Cont meds  Low fat diet  Graded exercise  Rtc annually    Health maintenance  Lipid ordered  Flu in fall  Tetanus q 10 years  Psa discussed  Colonoscopy dicussed     "This note will not be shared with the patient."   "

## 2022-09-30 ENCOUNTER — PATIENT MESSAGE (OUTPATIENT)
Dept: DERMATOLOGY | Facility: CLINIC | Age: 62
End: 2022-09-30
Payer: COMMERCIAL

## 2022-09-30 DIAGNOSIS — L40.50 PSORIATIC ARTHRITIS: ICD-10-CM

## 2022-09-30 DIAGNOSIS — L40.0 PSORIASIS VULGARIS: Primary | ICD-10-CM

## 2022-09-30 DIAGNOSIS — Z79.899 LONG-TERM USE OF HIGH-RISK MEDICATION: ICD-10-CM

## 2022-10-03 RX ORDER — APREMILAST 30 MG/1
TABLET, FILM COATED ORAL
Qty: 60 TABLET | Refills: 2 | Status: SHIPPED | OUTPATIENT
Start: 2022-10-03 | End: 2023-01-12

## 2022-10-19 ENCOUNTER — CLINICAL SUPPORT (OUTPATIENT)
Dept: ENDOSCOPY | Facility: HOSPITAL | Age: 62
End: 2022-10-19
Attending: FAMILY MEDICINE
Payer: COMMERCIAL

## 2022-10-19 VITALS — BODY MASS INDEX: 26.18 KG/M2 | WEIGHT: 215 LBS | HEIGHT: 76 IN

## 2022-10-19 DIAGNOSIS — Z12.11 SCREEN FOR COLON CANCER: ICD-10-CM

## 2022-10-19 RX ORDER — SODIUM, POTASSIUM,MAG SULFATES 17.5-3.13G
SOLUTION, RECONSTITUTED, ORAL ORAL
Qty: 1 KIT | Refills: 0 | Status: ON HOLD | OUTPATIENT
Start: 2022-10-19 | End: 2022-11-18 | Stop reason: HOSPADM

## 2022-11-14 ENCOUNTER — PATIENT MESSAGE (OUTPATIENT)
Dept: RESEARCH | Facility: HOSPITAL | Age: 62
End: 2022-11-14
Payer: COMMERCIAL

## 2022-11-14 ENCOUNTER — PATIENT MESSAGE (OUTPATIENT)
Dept: TRANSPLANT | Facility: CLINIC | Age: 62
End: 2022-11-14
Payer: COMMERCIAL

## 2022-11-18 ENCOUNTER — ANESTHESIA EVENT (OUTPATIENT)
Dept: ENDOSCOPY | Facility: HOSPITAL | Age: 62
End: 2022-11-18
Payer: COMMERCIAL

## 2022-11-18 ENCOUNTER — DOCUMENTATION ONLY (OUTPATIENT)
Dept: CARDIOLOGY | Facility: HOSPITAL | Age: 62
End: 2022-11-18
Payer: COMMERCIAL

## 2022-11-18 ENCOUNTER — HOSPITAL ENCOUNTER (OUTPATIENT)
Facility: HOSPITAL | Age: 62
Discharge: HOME | End: 2022-11-18
Attending: INTERNAL MEDICINE | Admitting: INTERNAL MEDICINE
Payer: COMMERCIAL

## 2022-11-18 ENCOUNTER — ANESTHESIA (OUTPATIENT)
Dept: ENDOSCOPY | Facility: HOSPITAL | Age: 62
End: 2022-11-18
Payer: COMMERCIAL

## 2022-11-18 VITALS
SYSTOLIC BLOOD PRESSURE: 123 MMHG | DIASTOLIC BLOOD PRESSURE: 72 MMHG | WEIGHT: 210 LBS | TEMPERATURE: 98 F | HEIGHT: 75 IN | RESPIRATION RATE: 16 BRPM | HEART RATE: 66 BPM | BODY MASS INDEX: 26.11 KG/M2 | OXYGEN SATURATION: 99 %

## 2022-11-18 DIAGNOSIS — Z12.11 SCREENING FOR COLON CANCER: ICD-10-CM

## 2022-11-18 PROCEDURE — 45385 COLONOSCOPY W/LESION REMOVAL: CPT | Mod: 33,,, | Performed by: INTERNAL MEDICINE

## 2022-11-18 PROCEDURE — 45385 PR COLONOSCOPY,REMV LESN,SNARE: ICD-10-PCS | Mod: 33,,, | Performed by: INTERNAL MEDICINE

## 2022-11-18 PROCEDURE — 88305 TISSUE EXAM BY PATHOLOGIST: CPT | Mod: 26,,, | Performed by: PATHOLOGY

## 2022-11-18 PROCEDURE — 25000003 PHARM REV CODE 250: Performed by: NURSE ANESTHETIST, CERTIFIED REGISTERED

## 2022-11-18 PROCEDURE — E9220 PRA ENDO ANESTHESIA: HCPCS | Mod: 33,,, | Performed by: NURSE ANESTHETIST, CERTIFIED REGISTERED

## 2022-11-18 PROCEDURE — 27201089 HC SNARE, DISP (ANY): Performed by: INTERNAL MEDICINE

## 2022-11-18 PROCEDURE — 45380 COLONOSCOPY AND BIOPSY: CPT | Mod: PT,59 | Performed by: INTERNAL MEDICINE

## 2022-11-18 PROCEDURE — 45380 COLONOSCOPY AND BIOPSY: CPT | Mod: 33,59,, | Performed by: INTERNAL MEDICINE

## 2022-11-18 PROCEDURE — 37000008 HC ANESTHESIA 1ST 15 MINUTES: Performed by: INTERNAL MEDICINE

## 2022-11-18 PROCEDURE — 63600175 PHARM REV CODE 636 W HCPCS: Performed by: NURSE ANESTHETIST, CERTIFIED REGISTERED

## 2022-11-18 PROCEDURE — 45380 PR COLONOSCOPY,BIOPSY: ICD-10-PCS | Mod: 33,59,, | Performed by: INTERNAL MEDICINE

## 2022-11-18 PROCEDURE — E9220 PRA ENDO ANESTHESIA: ICD-10-PCS | Mod: 33,,, | Performed by: NURSE ANESTHETIST, CERTIFIED REGISTERED

## 2022-11-18 PROCEDURE — 37000009 HC ANESTHESIA EA ADD 15 MINS: Performed by: INTERNAL MEDICINE

## 2022-11-18 PROCEDURE — 88305 TISSUE EXAM BY PATHOLOGIST: CPT | Performed by: PATHOLOGY

## 2022-11-18 PROCEDURE — 88305 TISSUE EXAM BY PATHOLOGIST: ICD-10-PCS | Mod: 26,,, | Performed by: PATHOLOGY

## 2022-11-18 PROCEDURE — 27201012 HC FORCEPS, HOT/COLD, DISP: Performed by: INTERNAL MEDICINE

## 2022-11-18 PROCEDURE — 45385 COLONOSCOPY W/LESION REMOVAL: CPT | Mod: PT | Performed by: INTERNAL MEDICINE

## 2022-11-18 RX ORDER — PROPOFOL 10 MG/ML
VIAL (ML) INTRAVENOUS
Status: DISCONTINUED | OUTPATIENT
Start: 2022-11-18 | End: 2022-11-18

## 2022-11-18 RX ORDER — PROPOFOL 10 MG/ML
VIAL (ML) INTRAVENOUS CONTINUOUS PRN
Status: DISCONTINUED | OUTPATIENT
Start: 2022-11-18 | End: 2022-11-18

## 2022-11-18 RX ORDER — LIDOCAINE HYDROCHLORIDE 20 MG/ML
INJECTION, SOLUTION EPIDURAL; INFILTRATION; INTRACAUDAL; PERINEURAL
Status: DISCONTINUED | OUTPATIENT
Start: 2022-11-18 | End: 2022-11-18

## 2022-11-18 RX ORDER — SODIUM CHLORIDE 9 MG/ML
INJECTION, SOLUTION INTRAVENOUS CONTINUOUS
Status: DISCONTINUED | OUTPATIENT
Start: 2022-11-18 | End: 2022-11-18 | Stop reason: HOSPADM

## 2022-11-18 RX ADMIN — Medication 175 MCG/KG/MIN: at 08:11

## 2022-11-18 RX ADMIN — LIDOCAINE HYDROCHLORIDE 50 MG: 20 INJECTION, SOLUTION EPIDURAL; INFILTRATION; INTRACAUDAL; PERINEURAL at 08:11

## 2022-11-18 RX ADMIN — SODIUM CHLORIDE: 9 INJECTION, SOLUTION INTRAVENOUS at 08:11

## 2022-11-18 RX ADMIN — PROPOFOL 100 MG: 10 INJECTION, EMULSION INTRAVENOUS at 08:11

## 2022-11-18 NOTE — H&P
Short Stay Endoscopy History and Physical    PCP - Brittney Weaver MD  Referring Physician - PRE-ADMIT, ENDO -[employer]  No address on file    Procedure - Colonoscopy  ASA - per anesthesia  Mallampati - per anesthesia  History of Anesthesia problems - no  Family history Anesthesia problems -  no   Plan of anesthesia - General    HPI  62 y.o. male  Reason for procedure:   Surveillance for polyps    ROS:  Constitutional: No fevers, chills, No weight loss  CV: No chest pain  Pulm: No cough, No shortness of breath  GI: see HPI    Medical History:  has no past medical history on file.    Surgical History:  has no past surgical history on file.    Family History: family history is not on file..    Social History:  reports that he has quit smoking. He has never used smokeless tobacco.    Review of patient's allergies indicates:   Allergen Reactions    Penicillins Dermatitis, Rash, Shortness Of Breath, Hives and Other (See Comments)    Sulfa (sulfonamide antibiotics) Shortness Of Breath, Other (See Comments), Rash and Hives     Other reaction(s): Rash, Unspecified  Other reaction(s): Rash       Medications:   Medications Prior to Admission   Medication Sig Dispense Refill Last Dose    OTEZLA 30 mg Tab Take 1 po bid 60 tablet 2 2022    clobetasoL (TEMOVATE) 0.05 % cream SMARTSI Topical Daily PRN       furosemide (LASIX) 20 MG tablet Take 1 tablet (20 mg total) by mouth daily as needed. 30 tablet 6     sodium,potassium,mag sulfates (SUPREP BOWEL PREP KIT) 17.5-3.13-1.6 gram SolR As Directed 1 kit 0        Physical Exam:    Vital Signs:   Vitals:    22 0742   BP: 124/74   Pulse: 71   Resp: 16   Temp: 98.1 °F (36.7 °C)       General Appearance: Well appearing in no acute distress  Abdomen: Soft, non tender, non distended with normal bowel sounds, no masses    Labs:  Lab Results   Component Value Date    WBC 5.09 2022    HGB 15.8 2022    HCT 45.1 2022     2022    CHOL 184  09/09/2022    TRIG 79 09/09/2022    HDL 54 09/09/2022    ALT 29 09/09/2022    AST 27 09/09/2022     09/09/2022    K 4.2 09/09/2022     09/09/2022    CREATININE 1.0 09/09/2022    BUN 10 09/09/2022    CO2 26 09/09/2022       I have explained the risks and benefits of this endoscopic procedure to the patient including but not limited to bleeding, inflammation, infection, perforation, and death.      Robby Lockwood MD

## 2022-11-18 NOTE — ANESTHESIA PREPROCEDURE EVALUATION
[facility]  Anesthesia Pre-Operative Evaluation       Patient Name: Clinton Weller  YOB: 1960  MRN: [id number]  [facility]: [id number]      Code Status: No Order   Date of Procedure: 2022  Anesthesia: General Procedure: Procedure(s) (LRB):  COLONOSCOPY (N/A)  Pre-Operative Diagnosis: Screen for colon cancer [Z12.11]  Proceduralist: Surgeon(s) and Role:     * Fran Zaragoza MD - Primary Nurse: (Unknown)      SUBJECTIVE:   Clinton Weller is a 62 y.o. male who  has no past medical history on file. No notes on file    No current facility-administered medications for this encounter.       he has a current medication list which includes the following long-term medication(s): clobetasol and furosemide.   ALLERGIES:     Review of patient's allergies indicates:   Allergen Reactions    Penicillins Dermatitis, Rash, Shortness Of Breath, Hives and Other (See Comments)    Sulfa (sulfonamide antibiotics) Shortness Of Breath, Other (See Comments), Rash and Hives     Other reaction(s): Rash, Unspecified  Other reaction(s): Rash     LDA:      Lines/Drains/Airways     None               MEDICATIONS:     Antibiotics (From admission, onward)    None        VTE Risk Mitigation (From admission, onward)    None        No current facility-administered medications for this encounter.     Current Outpatient Medications   Medication Sig Dispense Refill    clobetasoL (TEMOVATE) 0.05 % cream SMARTSI Topical Daily PRN      furosemide (LASIX) 20 MG tablet Take 1 tablet (20 mg total) by mouth daily as needed. 30 tablet 6    OTEZLA 30 mg Tab Take 1 po bid 60 tablet 2    sodium,potassium,mag sulfates (SUPREP BOWEL PREP KIT) 17.5-3.13-1.6 gram SolR As Directed 1 kit 0          History:   There are no hospital problems to display for this patient.    Surgical History:    has no past surgical history on file.   Social History:    has no history on file for sexual activity.  reports that he has quit  smoking. He has never used smokeless tobacco.     OBJECTIVE:     Vital Signs (Most Recent):    Vital Signs Range (Last 24H):          There is no height or weight on file to calculate BMI.   Wt Readings from Last 4 Encounters:   10/19/22 97.5 kg (215 lb)   09/09/22 96.2 kg (212 lb)   09/09/22 98.3 kg (216 lb 11.4 oz)   09/07/22 97.8 kg (215 lb 8 oz)     Significant Labs:  Lab Results   Component Value Date    WBC 5.09 09/09/2022    HGB 15.8 09/09/2022    HCT 45.1 09/09/2022     09/09/2022     09/09/2022    K 4.2 09/09/2022     09/09/2022    CREATININE 1.0 09/09/2022    BUN 10 09/09/2022    CO2 26 09/09/2022    GLU 79 09/09/2022    CALCIUM 9.4 09/09/2022    ALKPHOS 66 09/09/2022    ALT 29 09/09/2022    AST 27 09/09/2022    ALBUMIN 4.1 09/09/2022    NTPROBNP 181 (H) 09/09/2022     No LMP for male patient.  No results found for this or any previous visit (from the past 72 hour(s)).    EKG:   No results found for this or any previous visit.    TTE:  Results for orders placed or performed during the hospital encounter of 09/09/22   Echo   Result Value Ref Range    Ascending aorta 3.98 cm    STJ 3.68 cm    AV mean gradient 3 mmHg    Ao peak jb 1.15 m/s    Ao VTI 31.96 cm    IVS 1.05 0.6 - 1.1 cm    LA size 4.00 cm    Left Atrium Major Axis 4.72 cm    Left Atrium Minor Axis 4.85 cm    LVIDd 5.00 3.5 - 6.0 cm    LVIDs 3.92 2.1 - 4.0 cm    LVOT diameter 2.51 cm    LVOT peak VTI 25.87 cm    Posterior Wall 1.05 0.6 - 1.1 cm    MV Peak A Jb 0.77 m/s    E wave deceleration time 253.11 msec    MV Peak E Jb 0.48 m/s    PV Peak D Jb 0.32 m/s    PV Peak S Jb 0.36 m/s    RA Major Axis 4.53 cm    RA Width 3.57 cm    RVDD 3.73 cm    Sinus 3.88 cm    TAPSE 2.02 cm    TR Max Jb 2.16 m/s    TDI LATERAL 0.06 m/s    TDI SEPTAL 0.05 m/s    LA WIDTH 3.63 cm    MV stenosis pressure 1/2 time 73.40 ms    LV Diastolic Volume 118.23 mL    LV Systolic Volume 66.90 mL    LVOT peak jb 0.98 m/s    LA volume (mod) 49.19 cm3     "MV "A" wave duration 17.89 msec    RV S' 12.15 cm/s    LV LATERAL E/E' RATIO 8.00 m/s    LV SEPTAL E/E' RATIO 9.60 m/s    FS 22 %    LA volume 59.05 cm3    LV mass 194.38 g    Left Ventricle Relative Wall Thickness 0.42 cm    AV valve area 4.00 cm2    AV Velocity Ratio 0.85     AV index (prosthetic) 0.81     MV valve area p 1/2 method 3.00 cm2    E/A ratio 0.62     Mean e' 0.06 m/s    Pulm vein S/D ratio 1.13     LVOT area 4.9 cm2    LVOT stroke volume 127.94 cm3    AV peak gradient 5 mmHg    E/E' ratio 8.73 m/s    Triscuspid Valve Regurgitation Peak Gradient 19 mmHg    BSA 2.26 m2    LV Systolic Volume Index 29.7 mL/m2    LV Diastolic Volume Index 52.55 mL/m2    LA Volume Index 26.2 mL/m2    LV Mass Index 86 g/m2    LA Volume Index (Mod) 21.9 mL/m2    QEF 61 %    EF 58 %    Narrative    · The left ventricle is normal in size with  · The visually estimated ejection fraction is 58% ( mid to upper 50s but   no evidence of HCM).  · The quantitatively derived ejection fraction is 61%.  · Normal left ventricular diastolic function.  · There are segmental left ventricular wall motion abnormalities.  · Normal right ventricular size with normal right ventricular systolic   function.  · The ascending aorta is mildly dilated.        EF   Date Value Ref Range Status   09/09/2022 58 % Final      No results found for this or any previous visit.  JACE:  No results found for this or any previous visit.  Stress Test:  No results found for this or any previous visit.     LHC:  No results found for this or any previous visit.     PFT:  No results found for: FEV1, FVC, [id number], TLC, DLCO   ASSESSMENT/PLAN:         Pre-op Assessment    I have reviewed the Patient Summary Reports.     I have reviewed the Nursing Notes.    I have reviewed the Medications.     Review of Systems  Anesthesia Hx:  No problems with previous Anesthesia    Hematology/Oncology:  Hematology Normal   Oncology Normal     EENT/Dental:EENT/Dental Normal "   Cardiovascular:   Exercise tolerance: good Dysrhythmias    Pulmonary:  Pulmonary Normal    Renal/:  Renal/ Normal     Hepatic/GI:  Hepatic/GI Normal    Musculoskeletal:  Musculoskeletal Normal    Neurological:  Neurology Normal    Endocrine:  Endocrine Normal    Dermatological:  Skin Normal    Psych:  Psychiatric Normal           Physical Exam  General: Well nourished    Airway:  Mallampati: III / II  Mouth Opening: Normal  TM Distance: Normal  Tongue: Normal  Neck ROM: Normal ROM    Dental:  Intact        Anesthesia Plan  Type of Anesthesia, risks & benefits discussed:    Anesthesia Type: Gen ETT, Gen Natural Airway  Intra-op Monitoring Plan: Standard ASA Monitors  Post Op Pain Control Plan: multimodal analgesia and IV/PO Opioids PRN  Induction:  IV  Airway Plan: Direct and Video, Post-Induction  Informed Consent: Informed consent signed with the Patient and all parties understand the risks and agree with anesthesia plan.  All questions answered.   ASA Score: 2  Day of Surgery Review of History & Physical: H&P completed by Anesthesiologist.  Anesthesia Plan Notes: Chart reviewed, patient interviewed and examined.  The anesthetic plan was explained.  Risks, benefits, and alternatives were discussed. Questions were answered and the consent was signed.        HELDER Cochran M.D.         Ready For Surgery From Anesthesia Perspective.     .

## 2022-11-18 NOTE — PROVATION PATIENT INSTRUCTIONS
Discharge Summary/Instructions after an Endoscopic Procedure  Patient Name: Clinton Weller  Patient MRN: [id number]  Patient YOB: 1960 Friday, November 18, 2022  Fran Zaragoza MD  Dear patient,  As a result of recent federal legislation (The Federal Cures Act), you may   receive lab or pathology results from your procedure in your [facility]   account before your physician is able to contact you. Your physician or   their representative will relay the results to you with their   recommendations at their soonest availability.  Thank you,  RESTRICTIONS:  During your procedure today, you received medications for sedation.  These   medications may affect your judgment, balance and coordination.  Therefore,   for 24 hours, you have the following restrictions:   - DO NOT drive a car, operate machinery, make legal/financial decisions,   sign important papers or drink alcohol.    ACTIVITY:  Today: no heavy lifting, straining or running due to procedural   sedation/anesthesia.  The following day: return to full activity including work.  DIET:  Eat and drink normally unless instructed otherwise.     TREATMENT FOR COMMON SIDE EFFECTS:  - Mild abdominal pain, nausea, belching, bloating or excessive gas:  rest,   eat lightly and use a heating pad.  - Sore Throat: treat with throat lozenges and/or gargle with warm salt   water.  - Because air was used during the procedure, expelling large amounts of air   from your rectum or belching is normal.  - If a bowel prep was taken, you may not have a bowel movement for 1-3 days.    This is normal.  SYMPTOMS TO WATCH FOR AND REPORT TO YOUR PHYSICIAN:  1. Abdominal pain or bloating, other than gas cramps.  2. Chest pain.  3. Back pain.  4. Signs of infection such as: chills or fever occurring within 24 hours   after the procedure.  5. Rectal bleeding, which would show as bright red, maroon, or black stools.   (A tablespoon of blood from the rectum is not serious,  especially if   hemorrhoids are present.)  6. Vomiting.  7. Weakness or dizziness.  GO DIRECTLY TO THE NEAREST EMERGENCY ROOM IF YOU HAVE ANY OF THE FOLLOWING:      Difficulty breathing              Chills and/or fever over 101 F   Persistent vomiting and/or vomiting blood   Severe abdominal pain   Severe chest pain   Black, tarry stools   Bleeding- more than one tablespoon   Any other symptom or condition that you feel may need urgent attention  Your doctor recommends these additional instructions:  If any biopsies were taken, your doctors clinic will contact you in 1 to 2   weeks with any results.  - Discharge patient to home.   - High fiber diet indefinitely.   - Continue present medications.   - Use fiber, for example Citrucel, Fibercon, Konsyl or Metamucil.   - Await pathology results.   - Repeat colonoscopy in 3 years for surveillance.   - Return to referring physician as previously scheduled.   - Patient has a contact number available for emergencies.  The signs and   symptoms of potential delayed complications were discussed with the   patient.  Return to normal activities tomorrow.  Written discharge   instructions were provided to the patient.  For questions, problems or results please call your physician - Fran Zaragoza MD at Work:  [phone].  [facility] [place], EMERGENCY ROOM PHONE NUMBER: [phone]  IF A COMPLICATION OR EMERGENCY SITUATION ARISES AND YOU ARE UNABLE TO REACH   YOUR PHYSICIAN - GO DIRECTLY TO THE EMERGENCY ROOM.  Fran Zaragoza MD  11/18/2022 9:07:28 AM  This report has been verified and signed electronically.  Dear patient,  As a result of recent federal legislation (The Federal Cures Act), you may   receive lab or pathology results from your procedure in your [facility]   account before your physician is able to contact you. Your physician or   their representative will relay the results to you with their   recommendations at their soonest availability.  Thank  you,  PROVATION

## 2022-11-18 NOTE — PLAN OF CARE
From: Elaine Uribe  To: Isabel Clement DO  Sent: 11/10/2018 2:53 PM CST  Subject: Prescription Question    I called [facility], they do not have the RX for the K-Dur 10meq. It's not OTC, is it? Please advise- thanks! POC reviewed with pt. Verbalized understanding.

## 2022-11-18 NOTE — TRANSFER OF CARE
"Anesthesia Transfer of Care Note    Patient: Clinton Weller    Procedure(s) Performed: Procedure(s) (LRB):  COLONOSCOPY (N/A)    Patient location: GI    Anesthesia Type: general    Transport from OR: Transported from OR on room air with adequate spontaneous ventilation    Post pain: adequate analgesia    Post assessment: no apparent anesthetic complications    Post vital signs: stable    Level of consciousness: awake    Complications: none          Last vitals:   Visit Vitals  /74   Pulse 71   Temp 36.7 °C (98.1 °F)   Resp 16   Ht 6' 3" (1.905 m)   Wt 95.3 kg (210 lb)   SpO2 99%   BMI 26.25 kg/m²     "

## 2022-11-18 NOTE — PROGRESS NOTES
Patient has been identified as having an implanted cardiac rhythm device (CRD); the implanted device is a MDT defibrillator.      Pt going for Colonoscopy     Per protocol,a magnet should be applied directly over the implanted device during entire surgical procedure when electrocautery will be used.    If no electrocautery is planned, magnet application is not needed.    For additional questions, please contact the Arrhythmia Department at Ext [phone].

## 2022-11-18 NOTE — ANESTHESIA POSTPROCEDURE EVALUATION
Anesthesia Post Evaluation    Patient: Clinton Weller    Procedure(s) Performed: Procedure(s) (LRB):  COLONOSCOPY (N/A)    Final Anesthesia Type: general      Patient location during evaluation: PACU  Patient participation: Yes- Able to Participate  Level of consciousness: awake and alert  Post-procedure vital signs: reviewed and stable  Pain management: adequate  Airway patency: patent    PONV status at discharge: No PONV  Anesthetic complications: no      Cardiovascular status: blood pressure returned to baseline  Respiratory status: unassisted  Hydration status: euvolemic  Follow-up not needed.          Vitals Value Taken Time   /72 11/18/22 0943   Temp 36.8 °C (98.2 °F) 11/18/22 0911   Pulse 66 11/18/22 0943   Resp 16 11/18/22 0943   SpO2 99 % 11/18/22 0943         Event Time   Out of Recovery 09:44:05         Pain/Lucila Score: Lucila Score: 8 (11/18/2022  9:13 AM)

## 2022-11-29 LAB
FINAL PATHOLOGIC DIAGNOSIS: NORMAL
GROSS: NORMAL
Lab: NORMAL

## 2022-12-02 ENCOUNTER — PATIENT MESSAGE (OUTPATIENT)
Dept: TRANSPLANT | Facility: CLINIC | Age: 62
End: 2022-12-02
Payer: COMMERCIAL

## 2022-12-19 ENCOUNTER — PATIENT MESSAGE (OUTPATIENT)
Dept: TRANSPLANT | Facility: CLINIC | Age: 62
End: 2022-12-19
Payer: COMMERCIAL

## 2022-12-20 ENCOUNTER — OFFICE VISIT (OUTPATIENT)
Dept: NEUROLOGY | Facility: CLINIC | Age: 62
End: 2022-12-20
Payer: COMMERCIAL

## 2022-12-20 ENCOUNTER — OFFICE VISIT (OUTPATIENT)
Dept: FAMILY MEDICINE | Facility: CLINIC | Age: 62
End: 2022-12-20
Attending: FAMILY MEDICINE
Payer: COMMERCIAL

## 2022-12-20 ENCOUNTER — PATIENT MESSAGE (OUTPATIENT)
Dept: NEUROLOGY | Facility: CLINIC | Age: 62
End: 2022-12-20

## 2022-12-20 VITALS
WEIGHT: 215 LBS | SYSTOLIC BLOOD PRESSURE: 127 MMHG | BODY MASS INDEX: 26.73 KG/M2 | HEART RATE: 56 BPM | HEIGHT: 75 IN | DIASTOLIC BLOOD PRESSURE: 68 MMHG

## 2022-12-20 DIAGNOSIS — R05.3 CHRONIC COUGH: ICD-10-CM

## 2022-12-20 DIAGNOSIS — R42 DIZZINESS: ICD-10-CM

## 2022-12-20 DIAGNOSIS — H81.399 PERIPHERAL POSITIONAL VERTIGO, UNSPECIFIED LATERALITY: Primary | ICD-10-CM

## 2022-12-20 DIAGNOSIS — I10 ESSENTIAL HYPERTENSION: Primary | ICD-10-CM

## 2022-12-20 PROCEDURE — 3074F SYST BP LT 130 MM HG: CPT | Mod: CPTII,S$GLB,, | Performed by: PHYSICIAN ASSISTANT

## 2022-12-20 PROCEDURE — 99214 PR OFFICE/OUTPT VISIT, EST, LEVL IV, 30-39 MIN: ICD-10-PCS | Mod: 95,,, | Performed by: FAMILY MEDICINE

## 2022-12-20 PROCEDURE — 99999 PR PBB SHADOW E&M-EST. PATIENT-LVL IV: CPT | Mod: PBBFAC,,, | Performed by: PHYSICIAN ASSISTANT

## 2022-12-20 PROCEDURE — 1160F RVW MEDS BY RX/DR IN RCRD: CPT | Mod: CPTII,S$GLB,, | Performed by: PHYSICIAN ASSISTANT

## 2022-12-20 PROCEDURE — 99214 OFFICE O/P EST MOD 30 MIN: CPT | Mod: 95,,, | Performed by: FAMILY MEDICINE

## 2022-12-20 PROCEDURE — 3074F PR MOST RECENT SYSTOLIC BLOOD PRESSURE < 130 MM HG: ICD-10-PCS | Mod: CPTII,S$GLB,, | Performed by: PHYSICIAN ASSISTANT

## 2022-12-20 PROCEDURE — 3078F PR MOST RECENT DIASTOLIC BLOOD PRESSURE < 80 MM HG: ICD-10-PCS | Mod: CPTII,S$GLB,, | Performed by: PHYSICIAN ASSISTANT

## 2022-12-20 PROCEDURE — 99204 OFFICE O/P NEW MOD 45 MIN: CPT | Mod: S$GLB,,, | Performed by: PHYSICIAN ASSISTANT

## 2022-12-20 PROCEDURE — 99204 PR OFFICE/OUTPT VISIT, NEW, LEVL IV, 45-59 MIN: ICD-10-PCS | Mod: S$GLB,,, | Performed by: PHYSICIAN ASSISTANT

## 2022-12-20 PROCEDURE — 1159F MED LIST DOCD IN RCRD: CPT | Mod: CPTII,S$GLB,, | Performed by: PHYSICIAN ASSISTANT

## 2022-12-20 PROCEDURE — 3008F BODY MASS INDEX DOCD: CPT | Mod: CPTII,S$GLB,, | Performed by: PHYSICIAN ASSISTANT

## 2022-12-20 PROCEDURE — 1159F PR MEDICATION LIST DOCUMENTED IN MEDICAL RECORD: ICD-10-PCS | Mod: CPTII,S$GLB,, | Performed by: PHYSICIAN ASSISTANT

## 2022-12-20 PROCEDURE — 1160F PR REVIEW ALL MEDS BY PRESCRIBER/CLIN PHARMACIST DOCUMENTED: ICD-10-PCS | Mod: CPTII,S$GLB,, | Performed by: PHYSICIAN ASSISTANT

## 2022-12-20 PROCEDURE — 99999 PR PBB SHADOW E&M-EST. PATIENT-LVL IV: ICD-10-PCS | Mod: PBBFAC,,, | Performed by: PHYSICIAN ASSISTANT

## 2022-12-20 PROCEDURE — 3008F PR BODY MASS INDEX (BMI) DOCUMENTED: ICD-10-PCS | Mod: CPTII,S$GLB,, | Performed by: PHYSICIAN ASSISTANT

## 2022-12-20 PROCEDURE — 3078F DIAST BP <80 MM HG: CPT | Mod: CPTII,S$GLB,, | Performed by: PHYSICIAN ASSISTANT

## 2022-12-20 RX ORDER — PROMETHAZINE HYDROCHLORIDE AND DEXTROMETHORPHAN HYDROBROMIDE 6.25; 15 MG/5ML; MG/5ML
5 SYRUP ORAL NIGHTLY PRN
Qty: 180 ML | Refills: 0 | Status: SHIPPED | OUTPATIENT
Start: 2022-12-20 | End: 2022-12-30

## 2022-12-20 RX ORDER — LEVOCETIRIZINE DIHYDROCHLORIDE 5 MG/1
5 TABLET, FILM COATED ORAL NIGHTLY
Qty: 30 TABLET | Refills: 3 | Status: SHIPPED | OUTPATIENT
Start: 2022-12-20 | End: 2023-04-17 | Stop reason: SDUPTHER

## 2022-12-20 RX ORDER — METHYLPREDNISOLONE 4 MG/1
TABLET ORAL
Qty: 1 EACH | Refills: 0 | Status: SHIPPED | OUTPATIENT
Start: 2022-12-20 | End: 2023-01-10

## 2022-12-20 NOTE — PROGRESS NOTES
[facility] - NEUROLOGY [address]TH [place] [facility], [place]    Date: 12/20/22  Patient Name: Clinton Weller   MRN: [id number]   PCP: Brittney Weaver  Referring Provider: Brittney Weaver MD    Chief Complaint: Dizziness  Subjective:       HPI:   Mr. Clinton Weller is a 62 y.o. male with hypertrophic cardiomyopathy and MTHFR mutation presenting for evaluation of dizziness. On chart review he was seen in clinic today by his PCP for this problem and then referred for further workup and evaluation. Of note they were also sent to cardiology by their PCP for further workup and evaluation. He states the dizziness has occurred previously but it's been several years since it has occurred. He notes previously his dizziness was found to be due to low vitamin B12 for which he received injections and resolved his symptoms.     He notes when he has symptoms it is when he is standing he feels like he is pre-syncopal he notes he has had syncopal episodes previously due to tachycardia. He notes that he has not had an episode of LOC since that occurred. He also states that he has some numbness and tingling in his hands and feet. He notes that this typically occurs when he is changing position such as going from sitting to standing and he has noticed that squats and things also can trigger his symptoms. He denies any falls. He states that the symptoms last less than one minute and he tends to experience symptoms every 2-3 days. He denies any weakness, loss of vision, or LOC. He does have some SOB when the symptoms occur. He denies any associated N/V and also denies any weakness.     Family History- Father with Brain Cancer    PAST MEDICAL HISTORY:  Past Medical History:   Diagnosis Date    AICD (automatic cardioverter/defibrillator) present     Hypertrophic cardiomyopathy     Pacemaker     Psoriasis        PAST SURGICAL HISTORY:  Past Surgical History:   Procedure Laterality Date    COLONOSCOPY N/A  2022    Procedure: COLONOSCOPY;  Surgeon: Fran Zaragoza MD;  Location: [facility] ([address] [place] [address]);  Service: Endoscopy;  Laterality: N/A;  Fully vaccinated/ inst emailed-RB    precall [employer] PACEMAKER DEPENDENT- JLM       CURRENT MEDS:  Current Outpatient Medications   Medication Sig Dispense Refill    clobetasoL (TEMOVATE) 0.05 % cream SMARTSI Topical Daily PRN      furosemide (LASIX) 20 MG tablet Take 1 tablet (20 mg total) by mouth daily as needed. 30 tablet 6    levocetirizine (XYZAL) 5 MG tablet Take 1 tablet (5 mg total) by mouth every evening. 30 tablet 3    methylPREDNISolone (MEDROL DOSEPACK) 4 mg tablet use as directed 1 each 0    OTEZLA 30 mg Tab Take 1 po bid 60 tablet 2    promethazine-dextromethorphan (PROMETHAZINE-DM) 6.25-15 mg/5 mL Syrp Take 5 mLs by mouth nightly as needed (cough). 180 mL 0     No current facility-administered medications for this visit.       ALLERGIES:  Review of patient's allergies indicates:   Allergen Reactions    Penicillins Dermatitis, Rash, Shortness Of Breath, Hives and Other (See Comments)    Sulfa (sulfonamide antibiotics) Shortness Of Breath, Other (See Comments), Rash and Hives     Other reaction(s): Rash, Unspecified  Other reaction(s): Rash       FAMILY HISTORY:  History reviewed. No pertinent family history.    SOCIAL HISTORY:  Social History     Tobacco Use    Smoking status: Former    Smokeless tobacco: Never       Review of Systems:  Review of Systems   Constitutional:  Negative for chills, fever and weight loss.   HENT:  Positive for tinnitus. Negative for ear discharge, ear pain, hearing loss, sinus pain and sore throat.    Eyes:  Negative for blurred vision, double vision and photophobia.   Respiratory:  Positive for shortness of breath. Negative for cough and wheezing.    Cardiovascular:  Negative for chest pain, palpitations and orthopnea.   Gastrointestinal:  Negative for constipation, diarrhea, nausea and vomiting.   Genitourinary:   "Negative for dysuria, frequency and urgency.   Musculoskeletal:  Negative for back pain, myalgias and neck pain.   Neurological:  Positive for dizziness. Negative for tingling, focal weakness, seizures, loss of consciousness, weakness and headaches.          Objective:     Vitals:    12/20/22 1058   BP: 127/68   Pulse: (!) 56   Weight: 97.5 kg (215 lb)   Height: 6' 3" (1.905 m)       Lab Results   Component Value Date    WBC 5.09 09/09/2022    HGB 15.8 09/09/2022    HCT 45.1 09/09/2022     09/09/2022    CHOL 184 09/09/2022    TRIG 79 09/09/2022    HDL 54 09/09/2022    ALT 29 09/09/2022    AST 27 09/09/2022     09/09/2022    K 4.2 09/09/2022     09/09/2022    CREATININE 1.0 09/09/2022    BUN 10 09/09/2022    CO2 26 09/09/2022       NEUROLOGICAL EXAMINATION:     MENTAL STATUS   Oriented to person, place, and time.   Level of consciousness: alert    CRANIAL NERVES     CN III, IV, VI   Pupils are equal, round, and reactive to light.  Extraocular motions are normal.     CN V   Facial sensation intact.     CN VII   Facial expression full, symmetric.     CN VIII   CN VIII normal.     CN IX, X   CN IX normal.   CN X normal.     CN XI   CN XI normal.        L sided positive Epley maneuver      MOTOR EXAM   Muscle bulk: normal    Strength   Strength 5/5 throughout.     REFLEXES     Reflexes   Right brachioradialis: 2+  Left brachioradialis: 2+  Right biceps: 2+  Left biceps: 2+  Right triceps: 2+  Left triceps: 2+  Right patellar: 2+  Left patellar: 2+  Right achilles: 2+  Left achilles: 2+  ? ?        Assessment:   Clinton Weller is a 62 y.o. male presenting for evaluation regarding dizziness which occurs when changing position.    Plan:   I discussed with the patient in depth the risk/benefits of the following plan and the patient was amenable. We discussed the following:    Offered the patient Meclizine but discussed that since his symptoms are waxing and waning so quickly unclear if it would provide " any benefit    Referral placed for vestibular therapy and ENT evaluation    Labs were ordered to evaluate if there is an underlying metabolic cause of the patients neuropathy complaint.     Patient is set to follow up with cardiology as well and discussed this with the patient    Discussed all risks and benefits with the patient and he was amenable to this plan.           Problem List Items Addressed This Visit    None  Visit Diagnoses       Peripheral positional vertigo, unspecified laterality    -  Primary    Relevant Orders    Ambulatory referral/consult to Physical/Occupational Therapy    Ambulatory consult to ENT    Heavy Metals Screen, Blood (Quantitative)    Hepatitis C RNA, Quantitative, PCR    TSH    T4    Vitamin B1    Vitamin B2    Vitamin B6    RPR    Phosphatidylethanol (PETH)    Methylmalonic Acid, Serum    Chronic cough        Dizziness        Relevant Orders    Ambulatory referral/consult to Physical/Occupational Therapy    Ambulatory consult to ENT    Heavy Metals Screen, Blood (Quantitative)    Hepatitis C RNA, Quantitative, PCR    TSH    T4    Vitamin B1    Vitamin B2    Vitamin B6    RPR    Phosphatidylethanol (PETH)    Methylmalonic Acid, Serum              I spent a total of 45 minutes on the day of the visit. This includes face to face time and non-face to face time preparing to see the patient (eg, review of tests), obtaining and/or reviewing separately obtained history, documenting clinical information in the electronic or other health record, independently interpreting results and communicating results to the patient/family/caregiver, or care coordinator.        Deedee Wilkes PA-C  Supervising physician Mateo Pendleton MD   [facility]

## 2022-12-21 ENCOUNTER — CLINICAL SUPPORT (OUTPATIENT)
Dept: REHABILITATION | Facility: HOSPITAL | Age: 62
End: 2022-12-21
Payer: COMMERCIAL

## 2022-12-21 DIAGNOSIS — R42 DIZZINESS: ICD-10-CM

## 2022-12-21 DIAGNOSIS — H81.8X9 DEFICIT OF VESTIBULO-OCULAR REFLEX: ICD-10-CM

## 2022-12-21 DIAGNOSIS — H81.399 PERIPHERAL POSITIONAL VERTIGO, UNSPECIFIED LATERALITY: ICD-10-CM

## 2022-12-21 PROCEDURE — 97162 PT EVAL MOD COMPLEX 30 MIN: CPT | Mod: PO

## 2022-12-21 NOTE — PLAN OF CARE
"[facility]  Physical Therapy Neurological Rehabilitation Initial Evaluation    Name: Clinton Weller  Clinic Number: [id number]    Therapy Diagnosis:   Encounter Diagnoses   Name Primary?    Dizziness     Peripheral positional vertigo, unspecified laterality     Deficit of vestibulo-ocular reflex      Physician: Deedee Wilkes P*    Physician Orders: PT Eval and Treat Vestibular Therapy   Medical Diagnosis from Referral: R42 (ICD-10-CM) - Dizziness H81.399 (ICD-10-CM) - Peripheral positional vertigo, unspecified laterality   Evaluation Date: 12/21/2022  Insurance Authorization Period: 12/20/2022 - 12/20/2023   Plan of Care Expiration: 02/03/2023  Visit # / Visits authorized: 01/ 01 pending additional authorization     Time In: 0758  Time Out: 0840  Total Billable Time: 42 minutes    Precautions: Standard, Pacemaker    Subjective   Date of onset: Chronic   History of current condition - Clinton reports that symptoms started following an episode of cardiac arrest in 2016. Describes symptoms as unsteadiness ("moving on a train") and pre-syncope. Occurs nearly daily. History of severe concussions several years ago.   Triggers: Spontaneous and positional (changing height)   Duration of symptoms: ~30 seconds    Visual changes: Denies    Hearing Changes (hearing loss or tinnitus): Tinnitus bilaterally - chronic   Recent history of upper respiratory infection or GI infection: None known   Currently taking Meclizine: No     Pts goals: "Get rid of the dizziness"     Systems screening  Cardiovascular indicators: See medical history below    Is patient currently taking medication for stated indicators: Yes; Well-controlled  Neurological: Numbness/Tingling of hands and feet worsened during episodes     Medical History:   Past Medical History:   Diagnosis Date    AICD (automatic cardioverter/defibrillator) present     Hypertrophic cardiomyopathy     Pacemaker     Psoriasis        Surgical " "History:   Clinton Weller  has a past surgical history that includes Colonoscopy (N/A, 2022).    Medications:   Clinton has a current medication list which includes the following prescription(s): clobetasol, furosemide, levocetirizine, methylprednisolone, otezla, and promethazine-dextromethorphan.    Allergies:   Review of patient's allergies indicates:   Allergen Reactions    Penicillins Dermatitis, Rash, Shortness Of Breath, Hives and Other (See Comments)    Sulfa (sulfonamide antibiotics) Shortness Of Breath, Other (See Comments), Rash and Hives     Other reaction(s): Rash, Unspecified  Other reaction(s): Rash      Imaging: None recent    Vestibular Function Testing/Audiogram: 2022 " Audiogram results revealed normal sloping to severe sensorineural hearing loss (SNHL) in the right ear and normal sloping to severe SNHL in the left ear."    Prior Therapy: No prior vestibular rehab  Social History: Lives with spouse   Falls: None   DME: CPAP    Home Environment: Single story house with 5 steps to enter   Exercise Routine / History: Strength training and cardio regularly   Family Present at time of Eval: No   Occupation: Full time -    Prior Level of Function: Independent with ADLs, functional mobility, and recreational activities   Current Level of Function: Independent with ADLs, functional mobility, and recreational activities     Pain: Denies pain     Objective   Outcome Measure:   Dizziness Handicap Inventory: 28 - Mild    SYSTEMS SCREEN    - Follows commands: 100% of time   - Speech: no deficits     Mental status: normal mood, behavior, speech, dress, motor activity, and thought processes  Appearance: Casually dressed  Behavior:  calm and cooperative  Attention Span and Concentration:  Normal    Posture Alignment: WFL     Blood Pressure at rest (seated): 133/92 mmHg HR: 61 bpm  Blood Pressure immediately upon standin/88 mmHg  Blood Pressure after 2 minutes of standin/89 " "mmHg    Sensation: Reports baseline of numbness/tingling of hands and feet; worsened during dizziness episodes         Coordination: DNT    UPPER EXTREMITY--AROM/PROM  (R) UE: WFLs  (L) UE: WFLs         RANGE OF MOTION--LOWER EXTREMITIES  (R) LE Hip: normal   Knee: normal   Ankle: normal    (L) LE: Hip: normal   Knee: normal   Ankle: normal    Strength: manual muscle test grades below   Upper Extremity Strength: DNT   Lower Extremity Strength: DNT      ROM:   CERVICAL SPINE  Flexion: WNL   Extension: WNL  (unsteadiness "moving train")  L side bend: DNT   R side bend: DNT   L rotation: WNL   R rotation: WNL   Are concurrent symptoms present with any of these movements: As noted above     Modified VAS (Vertebral Artery Screen), in sitting (rotation, then extension):  R: Negative   L: Negative    VESTIBULAR EXAMINATION    Oculomotor Screen in room light (fixation present):   Known eye dysfunction: None   Ocular ROM: WNL    Tracking/Smooth Pursuits: Intact  Saccades: Intact  Convergence: DNT  Spontaneous Nystagmus: Absent   Gaze Holding Nystagmus: Absent     Slow VOR Screen: WNL  VOR Cancellation: DNT  Head Thrust Test: Loss of gaze bilaterally    Oculomotor Screen with fixation suppressed (Infrared goggles): DNT    Dynamic Visual Acuity: 6 line loss    POSITIONAL CANAL TESTING  Looking for nystagmus (slow phase followed by quick phase to the affected side for BPPV)    Mark Hallpike (posterior / CL anterior)   Right : Negative nystagmus, Negative dizziness   Left: Negative nystagmus, Negative dizziness  Horizontal Canals   Right: Negative nystagmus, Negative dizziness   Left: Negative nystagmus, Negative dizziness   Treatment Performed: Not indicated    Functional Gait Assessment:   1. Gait on level surface =  3   (3) Normal: less than 5.5 sec, no A.D., no imbalance, normal gait pattern, deviates< 6in   (2) Mild impairment: 7-5.6 sec, uses A.D., mild gait deviations, or deviates 6-10 in   (1) Moderate impairment: > 7 " sec, slow speed, imbalance, deviates 10-15 in.   (0) Severe impairment: needs assist, deviates >15 in, reach/touch wall  2. Change in Gait Speed = 3   (3) Normal: smooth change w/o loss of balance or gait deviation, deviates < 6 in, significant difference between speeds   (2) Mild impairment: changes speed, but demonstrates mild gait deviations, deviates 6-10 in, OR no deviations but unable to significantly speed, OR uses A.D.   (1) Moderate impairment: minor changes to speed, OR changes speed w/ significant deviations, deviates 10-15 in, OR  Changes speed , but loses balance & recovers   (0) Severe impairment: cannot change speed, deviates >15 in, or loses balance & needs assist  3. Gait with horizontal head turns  = 2   (3) Normal: no change in gait, deviates <6 in   (2) Mild impairment: slight change in speed, deviates 6-10 in, OR uses A.D.   (1) Moderate impairment: moderate change in speed, deviates 10-15 in   (0) Severe impairment: severe disruption of gait, deviates >15in  4. Gait with vertical head turns = 2   (3) Normal: no change in gait, deviates <6 in   (2) Mild impairment: slight change in speed, deviates 6-10 in OR uses A.D.   (1) Moderate impairment: moderate change in speed, deviates 10-15 in   (0) Severe impairment: severe disruption of gait, deviates >15 in  5. Gait with pivot turns = 3   (3) Normal: performs safely in 3 sec, no LOB   (2) Mild impairment: performs in >3 sec & no LOB, OR turns safely & requires several steps to regain LOB   (1) Moderate impairment: turns slow, OR requires several small steps for balance following turn & stop   (0) Severe impairment: cannot turn safely, needs assist  6. Step over obstacle = 3   (3) Normal: steps over 2 stacked boxes w/o change in speed or LOB   (2) Mild impairment: able to step over 1 box w/o change in speed or LOB   (1) Moderate impairment: steps over 1 box but must slow down, may require VC   (0) Severe impairment: cannot perform w/o assist  7.  Gait with Narrow JARED = 3   (3) Normal: 10 steps no staggering   (2) Mild impairment: 7-9 steps   (1) Moderate impairment: 4-7 steps   (0) Severe impairment: < 4 steps or cannot perform w/o assist  8. Gait with eyes closed = 2   (3) Normal: < 7 sec, no A.D., no LOB, normal gait pattern, deviates <6 in   (2) Mild impairment: 7.1-9 sec, mild gait deviations, deviates 6-10 in   (1) Moderate impairment: > 9 sec, abnormal pattern, LOB, deviates 10-15 in   (0) Severe impairment: cannot perform w/o assist, LOB, deviates >15in  9. Ambulating Backwards = 3   (3) Normal: no A.D., no LOB, normal gait pattern, deviates <6in   (2) Mild impairment: uses A.D., slower speed, mild gait deviations, deviates 6-10 in   (1) Moderate impairment: slow speed, abnormal gait pattern, LOB, deviates 10-15 in   (0) Severe impairment: severe gait deviations or LOB, deviates >15in  10. Steps = 3   (3) Normal: alternating feet, no rail   (2) Mild Impairment: alternating feet, uses rail   (1) Moderate impairment: step-to, uses rail   (0) Severe impairment: cannot perform safely    Score 28/30     Score:   <22/30 fall risk   <20/30 fall risk in older adults   <18/30 fall risk in Parkinsons        Postural control: MCTSIB: Evaluation 12/21/2022 (Average of 3 trials)   1. Eyes Open/feet together/Firm: 30 seconds   2. Eyes Closed/feet together/Firm:  30 seconds - mod sway   3. Eyes Open/feet together/Foam:  30 seconds   4. Eyes Closed/feet together/Foam:  30 seconds - mod sway   TOTAL 120/120     CMS Impairment/Limitation/Restriction for FOTO Vestibular Survey    Therapist reviewed FOTO scores for Clinton Weller on 12/21/2022.   FOTO documents entered into [employer] - see Media section.    Limitation Score: 50%  Category: Mobility       TREATMENT   No treatment provided today. All time spent on evaluation.     Home Exercises and Patient Education Provided    Education provided: Examination findings, POC, scheduling, goals of therapy    Written Home  "Exercises Provided: No. To be established at initial follow up session.     Assessment   Clinton is a 62 y.o. male referred to outpatient Physical Therapy with a medical diagnosis of Dizziness and Peripheral positional vertigo, unspecified laterality. Patient presents with a chief complaint of dizziness episodes. He describes his dizziness as "moving on a train" and well as faintness. Patient was screened for impairments of hemodynamics, cervical range, oculomotor function, gaze stabilization, and balance. He was also screened for BPPV which was negative for each canal bilaterally. Orthostatic hypotension screen was negative and oculomotor screen was within normal limits. He exhibited impaired gaze stabilization with Dynamic Visual Acuity and Head Thrust tests, indicating deficit of vestibulo-ocular reflex. He performed pretty well with balance assessment, but did exhibit some difficulty with vision-eliminated conditions and conditions requiring head movements; suggesting sensitivity to vestibular stimuli. Clinton's episodes of dizziness are likely multi-factorial given his cardiac history; however, today's examination does suggest mild vestibular pathology. Therefore, Clinton will benefit from vestibular rehab to address stated impairments to reduce frequency and intensity of dizziness episodes.      Pt prognosis is Good.   Pt will benefit from skilled outpatient Physical Therapy to address the deficits stated above and in the chart below, provide pt/family education, and to maximize pt's level of independence.     Plan of care discussed with patient: Yes   Pt's spiritual, cultural and educational needs considered and patient is agreeable to the plan of care and goals as stated below:     Anticipated Barriers for therapy: None foreseen    Medical Necessity is demonstrated by the following  History  Co-morbidities and personal factors that may impact the plan of care Co-morbidities:   Cardiac involvement with " pacemaker    Personal Factors:   no deficits     moderate   Examination  Body Structures and Functions, activity limitations and participation restrictions that may impact the plan of care Body Regions:   head    Body Systems:    balance  Vestibular system    Participation Restrictions:   None    Activity limitations:   Learning and applying knowledge  no deficits    General Tasks and Commands  no deficits    Communication  no deficits    Mobility  walking    Self care  no deficits    Domestic Life  no deficits    Interactions/Relationships  no deficits    Life Areas  no deficits    Community and Social Life  no deficits         moderate   Clinical Presentation evolving clinical presentation with changing clinical characteristics moderate   Decision Making/ Complexity Score: moderate     Goals:  Long Term Goals (LTG), 4 weeks.   Pt agrees to goals set. Eval date: 12/21/2022     Status   LTG: Patient will be independent with HEP emphasizing gaze stabilization and balance.  Education required Ongoing   LTG: Patient will exhibit improved Dizziness Handicap Inventory to 18 indicating decreased self-perceived disability related to dizziness. 28 - Mild Ongoing   LTG: Patient will exhibit </= 2 line loss with Dynamic Visual Acuity testing, indicating improved gaze stabilization 6 line loss Ongoing   LTG: Patient will exhibit improved Functional Gait Assessment score to >/= 30 /30 indicating improved dynamic balance for increased safety ambulatory tasks 30/30 Ongoing   LTG: Patient will exhibit Motion Sensitivity Test within the Mild range, indicating improved motion tolerance. To be assessed Ongoing       Plan   Plan of care Certification: 12/21/2022 to 02/03/2023.    Outpatient Physical Therapy 2 times weekly for 6 weeks to include the following interventions: Neuromuscular Re-ed, Patient Education, Self Care, Therapeutic Activities, and Therapeutic Exercise.     Charis Montana, PT

## 2022-12-23 NOTE — PROGRESS NOTES
"  Physical Therapy Daily Treatment Note     Name: Clinton Weller  Clinic Number: [id number]    Therapy Diagnosis: No diagnosis found.  Physician: Deedee Wlikes P*    Visit Date: 12/27/2022    Physician Orders: PT Eval and Treat Vestibular Therapy   Medical Diagnosis from Referral: R42 (ICD-10-CM) - Dizziness H81.399 (ICD-10-CM) - Peripheral positional vertigo, unspecified laterality   Evaluation Date: 12/21/2022  Insurance Authorization Period: 12/27/2022 - 12/20/2022  Plan of Care Expiration: 02/03/2023  Visit # / Visits authorized: 01/ 20 +eval     Time In: 0758 ***  Time Out: 0840 ***  Total Billable Time: 42 minutes     Precautions: Standard, Pacemaker    PTA Visit #: ***/5     Missed visits: ***  Cancelled visits: ***  Subjective     Pt reports: ***.  He {Actions; was/was not:92496} compliant with home exercise program. ***  Response to previous treatment: No adverse effects reported ***  Functional change: Ongoing ***    Pain: {0-10:78572::"0"}/10  Location: {RIGHT/LEFT/BILATERAL:31341} {LOCATION ON BODY:82740}     Objective     No objective measurement taken this date. ***    Treatment     Clinton received therapeutic exercises to develop {AMB PT PROGRESS OBJECTIVE:71334} for *** minutes including:  ***    Clinton received the following manual therapy techniques: {AMB PT PROGRESS MANUAL THERAPY:38614} were applied to the: *** for *** minutes, including:  ***    Clinton participated in neuromuscular re-education activities to improve: {AMB PT PROGRESS NEURO RE-ED:11149} for *** minutes. The following activities were included:  ***    Clinton participated in dynamic functional therapeutic activities to improve functional performance for ***  minutes, including:  ***    Clinton participated in gait training to improve functional mobility and safety for ***  minutes, including:  ***    Home Exercises Provided and Patient Education Provided     Education provided:   - ***, POC, Proper technique with therapeutic " "interventions, Benefits/purpose of today's therapeutic interventions    Written Home Exercises Provided: {Yesica single:87257::"yes","Patient instructed to cont prior HEP"}.  Exercises were reviewed and Clinton was able to demonstrate them prior to the end of the session.  Clinton demonstrated {Desc; good/fair/poor:31223} understanding of the education provided.     See EMR under {Blank single:05369::"Media","Patient Instructions"} for exercises provided {Blank single:68818::"12/27/2022","prior visit"}.    Assessment   ***    Clinton Is progressing well towards his goals.   Pt prognosis is Good.     Pt will continue to benefit from skilled outpatient physical therapy to address the deficits listed in the problem list box on initial evaluation, provide pt/family education and to maximize pt's level of independence in the home and community environment.     Pt's spiritual, cultural and educational needs considered and pt agreeable to plan of care and goals.     Anticipated barriers to physical therapy: None foreseen     Goals:   Goals:  Long Term Goals (LTG), 4 weeks.   Pt agrees to goals set. Eval date: 12/21/2022       Status   LTG: Patient will be independent with HEP emphasizing gaze stabilization and balance.  Education required Ongoing   LTG: Patient will exhibit improved Dizziness Handicap Inventory to 18 indicating decreased self-perceived disability related to dizziness. 28 - Mild Ongoing   LTG: Patient will exhibit </= 2 line loss with Dynamic Visual Acuity testing, indicating improved gaze stabilization 6 line loss Ongoing   LTG: Patient will exhibit improved Functional Gait Assessment score to >/= 30 /30 indicating improved dynamic balance for increased safety ambulatory tasks 30/30 Ongoing   LTG: Patient will exhibit Motion Sensitivity Test within the Mild range, indicating improved motion tolerance. To be assessed Ongoing        Plan     ***    Charis Montana, PT     "

## 2022-12-27 ENCOUNTER — CLINICAL SUPPORT (OUTPATIENT)
Dept: REHABILITATION | Facility: HOSPITAL | Age: 62
End: 2022-12-27
Attending: PHYSICIAN ASSISTANT
Payer: COMMERCIAL

## 2022-12-27 ENCOUNTER — LAB VISIT (OUTPATIENT)
Dept: LAB | Facility: HOSPITAL | Age: 62
End: 2022-12-27
Attending: FAMILY MEDICINE
Payer: COMMERCIAL

## 2022-12-27 DIAGNOSIS — R42 DIZZINESS: ICD-10-CM

## 2022-12-27 DIAGNOSIS — H81.399 PERIPHERAL POSITIONAL VERTIGO, UNSPECIFIED LATERALITY: ICD-10-CM

## 2022-12-27 DIAGNOSIS — R05.3 CHRONIC COUGH: ICD-10-CM

## 2022-12-27 DIAGNOSIS — R42 DIZZINESS: Primary | ICD-10-CM

## 2022-12-27 LAB
ALBUMIN SERPL BCP-MCNC: 3.8 G/DL (ref 3.5–5.2)
ALP SERPL-CCNC: 75 U/L (ref 55–135)
ALT SERPL W/O P-5'-P-CCNC: 31 U/L (ref 10–44)
ANION GAP SERPL CALC-SCNC: 11 MMOL/L (ref 8–16)
AST SERPL-CCNC: 29 U/L (ref 10–40)
BASOPHILS # BLD AUTO: 0.03 K/UL (ref 0–0.2)
BASOPHILS NFR BLD: 0.5 % (ref 0–1.9)
BILIRUB SERPL-MCNC: 1.2 MG/DL (ref 0.1–1)
BUN SERPL-MCNC: 9 MG/DL (ref 6–20)
CALCIUM SERPL-MCNC: 9.2 MG/DL (ref 8.7–10.5)
CHLORIDE SERPL-SCNC: 106 MMOL/L (ref 95–110)
CO2 SERPL-SCNC: 26 MMOL/L (ref 23–29)
CREAT SERPL-MCNC: 0.9 MG/DL (ref 0.5–1.4)
DIFFERENTIAL METHOD: NORMAL
EOSINOPHIL # BLD AUTO: 0.2 K/UL (ref 0–0.5)
EOSINOPHIL NFR BLD: 3.5 % (ref 0–8)
ERYTHROCYTE [DISTWIDTH] IN BLOOD BY AUTOMATED COUNT: 12.3 % (ref 11.5–14.5)
EST. GFR  (NO RACE VARIABLE): >60 ML/MIN/1.73 M^2
GLUCOSE SERPL-MCNC: 99 MG/DL (ref 70–110)
HCT VFR BLD AUTO: 46.7 % (ref 40–54)
HGB BLD-MCNC: 15.2 G/DL (ref 14–18)
IMM GRANULOCYTES # BLD AUTO: 0.02 K/UL (ref 0–0.04)
IMM GRANULOCYTES NFR BLD AUTO: 0.4 % (ref 0–0.5)
LYMPHOCYTES # BLD AUTO: 2.1 K/UL (ref 1–4.8)
LYMPHOCYTES NFR BLD: 37.5 % (ref 18–48)
MCH RBC QN AUTO: 30.9 PG (ref 27–31)
MCHC RBC AUTO-ENTMCNC: 32.5 G/DL (ref 32–36)
MCV RBC AUTO: 95 FL (ref 82–98)
MONOCYTES # BLD AUTO: 0.3 K/UL (ref 0.3–1)
MONOCYTES NFR BLD: 5.4 % (ref 4–15)
NEUTROPHILS # BLD AUTO: 3 K/UL (ref 1.8–7.7)
NEUTROPHILS NFR BLD: 52.7 % (ref 38–73)
NRBC BLD-RTO: 0 /100 WBC
PLATELET # BLD AUTO: 162 K/UL (ref 150–450)
PMV BLD AUTO: 10.9 FL (ref 9.2–12.9)
POTASSIUM SERPL-SCNC: 4.4 MMOL/L (ref 3.5–5.1)
PROT SERPL-MCNC: 6.6 G/DL (ref 6–8.4)
RBC # BLD AUTO: 4.92 M/UL (ref 4.6–6.2)
SODIUM SERPL-SCNC: 143 MMOL/L (ref 136–145)
T4 SERPL-MCNC: 5.6 UG/DL (ref 4.5–11.5)
TSH SERPL DL<=0.005 MIU/L-ACNC: 2.64 UIU/ML (ref 0.4–4)
VIT B12 SERPL-MCNC: 561 PG/ML (ref 210–950)
WBC # BLD AUTO: 5.7 K/UL (ref 3.9–12.7)

## 2022-12-27 PROCEDURE — 97110 THERAPEUTIC EXERCISES: CPT | Mod: PO,CQ

## 2022-12-27 PROCEDURE — 87522 HEPATITIS C REVRS TRNSCRPJ: CPT | Performed by: PHYSICIAN ASSISTANT

## 2022-12-27 PROCEDURE — 80321 ALCOHOLS BIOMARKERS 1OR 2: CPT | Performed by: PHYSICIAN ASSISTANT

## 2022-12-27 PROCEDURE — 84436 ASSAY OF TOTAL THYROXINE: CPT | Performed by: PHYSICIAN ASSISTANT

## 2022-12-27 PROCEDURE — 83921 ORGANIC ACID SINGLE QUANT: CPT | Performed by: PHYSICIAN ASSISTANT

## 2022-12-27 PROCEDURE — 84425 ASSAY OF VITAMIN B-1: CPT | Performed by: PHYSICIAN ASSISTANT

## 2022-12-27 PROCEDURE — 84207 ASSAY OF VITAMIN B-6: CPT | Performed by: PHYSICIAN ASSISTANT

## 2022-12-27 PROCEDURE — 84252 ASSAY OF VITAMIN B-2: CPT | Performed by: PHYSICIAN ASSISTANT

## 2022-12-27 PROCEDURE — 82300 ASSAY OF CADMIUM: CPT | Performed by: PHYSICIAN ASSISTANT

## 2022-12-27 PROCEDURE — 82607 VITAMIN B-12: CPT | Performed by: FAMILY MEDICINE

## 2022-12-27 PROCEDURE — 97116 GAIT TRAINING THERAPY: CPT | Mod: PO,CQ

## 2022-12-27 PROCEDURE — 80053 COMPREHEN METABOLIC PANEL: CPT | Performed by: FAMILY MEDICINE

## 2022-12-27 PROCEDURE — 84443 ASSAY THYROID STIM HORMONE: CPT | Performed by: PHYSICIAN ASSISTANT

## 2022-12-27 PROCEDURE — 85025 COMPLETE CBC W/AUTO DIFF WBC: CPT | Performed by: FAMILY MEDICINE

## 2022-12-27 PROCEDURE — 97112 NEUROMUSCULAR REEDUCATION: CPT | Mod: PO,CQ

## 2022-12-27 PROCEDURE — 86592 SYPHILIS TEST NON-TREP QUAL: CPT | Performed by: PHYSICIAN ASSISTANT

## 2022-12-27 NOTE — PROGRESS NOTES
"[facility]   Physical Therapy Treatment Note     Name: Clinton Weller  Clinic Number: [id number]    Therapy Diagnosis:   Encounter Diagnosis   Name Primary?    Dizziness Yes     Physician: Deedee Wilkes P*    Visit Date: 12/27/2022    Physician Orders: PT Eval and Treat Vestibular Therapy   Medical Diagnosis from Referral: R42 (ICD-10-CM) - Dizziness H81.399 (ICD-10-CM) - Peripheral positional vertigo, unspecified laterality   Evaluation Date: 12/21/2022  Insurance Authorization Period: 12/20/2022 - 12/20/2023   Plan of Care Expiration: 02/03/2023  Visit # / Visits authorized: 2/ 01 pending additional authorization     PTA Visit #: 1/5     Time In: 08:45  Time Out: 09:30  Total Billable Time: 45 minutes    Precautions: Standard, Pacemaker    SUBJECTIVE     Pt reports: " I feel like I'm on a train when I walk, very wobbly."  He  will be given an HEP Today    Response to previous treatment: no problems  Functional change: ongoing    Pain: 0/10  Location: N/A      OBJECTIVE     Objective Measures updated at progress report unless specified.     Treatment     Clinton received the treatments listed below:      therapeutic exercises to develop strength, endurance, ROM, flexibility, posture, and core stabilization for 15 minutes including:  Seated on Brown bench:    3 x 30 sec of VOR 1 with near target, horizontal head turns.     3 x 30 sec of VOR 1, near target with vertical head turns    Seated in gold chair: 6 ft away from target   2 x 30 sec of VOR Horizontal head turns   2 x 30 sec of VOR 1 vertical head turns    neuromuscular re-education activities to improve: Balance, Coordination, Kinesthetic, Sense, Proprioception, and Posture for 10 minutes. The following activities were included:    // bars:   Airex foam pad: CGA   3 x 30 sec of static standing with Horizontal  VOR 1 looking at a target in front of him, WBOS   2 x 30 sec of static standing with Horizontal VOR 1 looking at a " target in front of him, NBOS   2 x 30 sec of static standing with Vertical VOR 1, WBOS       gait training to improve functional mobility and safety for 20  minutes, including:    4 x 100 ft of forward ambulation with head turns right to left, CGA.  Pt with sway noted, no increase of dizziness noted  2 x 100 ft of forward ambulation with head nods up/down, CGA, no sway noted  X 6 laps doing figure 8's around 3 cones with head turns right to left, CGA    therapeutic activities to improve functional performance for 0  minutes, including:      Patient Education and Home Exercises     Home Exercises Provided and Patient Education Provided     Education provided:   - HEP    Written Home Exercises Provided: yes. Exercises were reviewed and Clinton was able to demonstrate them prior to the end of the session.  Clinton demonstrated good  understanding of the education provided. See EMR under Patient Instructions for exercises provided during therapy sessions    ASSESSMENT     Clinton tolerated his first tx session following initial evaluation well and did not have any problems.  Clinton began the treatment session with a 4/10 dizziness and ended the tx session with a decreased dizziness at 2/10.  Clinton was educated on VOR 1 exercises for HEP and was able to demonstrate understanding of all exercises.      Clinton Is progressing well towards his goals.   Pt prognosis is Good.     Pt will continue to benefit from skilled outpatient physical therapy to address the deficits listed in the problem list box on initial evaluation, provide pt/family education and to maximize pt's level of independence in the home and community environment.     Pt's spiritual, cultural and educational needs considered and pt agreeable to plan of care and goals.     Anticipated barriers to physical therapy: None    Goals:  Long Term Goals (LTG), 4 weeks.   Pt agrees to goals set. Eval date: 12/21/2022       Status   LTG: Patient will be independent with HEP  emphasizing gaze stabilization and balance.  Education required Ongoing   LTG: Patient will exhibit improved Dizziness Handicap Inventory to 18 indicating decreased self-perceived disability related to dizziness. 28 - Mild Ongoing   LTG: Patient will exhibit </= 2 line loss with Dynamic Visual Acuity testing, indicating improved gaze stabilization 6 line loss Ongoing   LTG: Patient will exhibit improved Functional Gait Assessment score to >/= 30 /30 indicating improved dynamic balance for increased safety ambulatory tasks 30/30 Ongoing   LTG: Patient will exhibit Motion Sensitivity Test within the Mild range, indicating improved motion tolerance. To be assessed Ongoing        PLAN     Cont with VOR, habituation and balance    Maura Gonzales, PTA

## 2022-12-28 LAB
ARSENIC BLD-MCNC: 2 NG/ML
CADMIUM BLD-MCNC: 0.3 NG/ML
CITY: NORMAL
COUNTY: NORMAL
GUARDIAN FIRST NAME: NORMAL
GUARDIAN LAST NAME: NORMAL
HCV RNA SERPL QL NAA+PROBE: NOT DETECTED
HCV RNA SPEC NAA+PROBE-ACNC: NOT DETECTED IU/ML
HOME PHONE: NORMAL
LEAD BLD-MCNC: 1.8 MCG/DL
MERCURY BLD-MCNC: 2 NG/ML
RACE: NORMAL
RPR SER QL: NORMAL
STATE: NORMAL
STREET ADDRESS: NORMAL
VENOUS/CAPILLARY: NORMAL
ZIP: NORMAL

## 2022-12-28 NOTE — PROGRESS NOTES
"[facility]   Physical Therapy Treatment Note     Name: Clinton Weller  Clinic Number: [id number]    Therapy Diagnosis:   Encounter Diagnosis   Name Primary?    Dizziness Yes       Physician: Deedee Wilkes P*    Visit Date: 12/29/2022    Physician Orders: PT Eval and Treat Vestibular Therapy   Medical Diagnosis from Referral: R42 (ICD-10-CM) - Dizziness H81.399 (ICD-10-CM) - Peripheral positional vertigo, unspecified laterality   Evaluation Date: 12/21/2022  Insurance Authorization Period: 12/20/2022 - 12/20/2023   Plan of Care Expiration: 02/03/2023  Visit # / Visits authorized: 2/20 +eval     PTA Visit #: 0/5     Time In: 0915   Time Out: 0954  Total Billable Time: 39 minutes    Precautions: Standard, Pacemaker    SUBJECTIVE     Pt reports: feeling "great" today. First follow up appointment went well. He noticed more disorientation with L head turning during ambulation.   He  will be given an HEP Today    Response to previous treatment: no problems  Functional change: ongoing    Pain: 0/10  Location: N/A      OBJECTIVE     Objective Measures updated at progress report unless specified.     Treatment     Clinton received the treatments listed below:      neuromuscular re-education activities to improve: Balance, Coordination, Kinesthetic, Sense, Proprioception, and Posture for 39 minutes. The following activities were included:    Seated:  1 x 30s, VORx1 - horizontal at 120 bpm - target unfocused   2 x 30s, VORx1 - horizontal at 105 bpm  1 x 30s, VORx1 - vertical at 105 bpm  2 x 30s, VORx1 - vertical at 120 bpm    Hallway ambulation:  4 x 100 feet, Horizontal head turns  2 x 100 feet, Vertical head nods  X 100 feet, Diagonal plane head turns - moderate instability     Ambulation 40ft walkway:  Vision eliminated, Min VCs for path maintenance     // bars:   X 6 laps, Tandem ambulation - no UE support  2 x 60s, Lateral weightshifting on rocker board  2 x 60s, A/P weightshifting on " rocker board  Airex foam pad: feet together   3 x 30s, Eyes closed   2 x 30s, Eyes closed with horizontal head turns   2 x 30s, Eyes closed with vertical head turns   2 x 45s Trunk twists (passing ball with PT positioned behind)    therapeutic activities to improve functional performance for 0  minutes, including:      Patient Education and Home Exercises     Home Exercises Provided and Patient Education Provided     Education provided:   - HEP    Written Home Exercises Provided: yes. Exercises were reviewed and Clinton was able to demonstrate them prior to the end of the session.  Clinton demonstrated good  understanding of the education provided. See EMR under Patient Instructions for exercises provided during therapy sessions    ASSESSMENT     Clinton tolerated today's session well. Several interventions were progressed today without excessive symptom provocation. His balance was most challenged with diagonal head turns during ambulation. PT to continue per established plan of care, emphasizing gaze stabilization, motion tolerance, and balance training.     Clinton Is progressing well towards his goals.   Pt prognosis is Good.     Pt will continue to benefit from skilled outpatient physical therapy to address the deficits listed in the problem list box on initial evaluation, provide pt/family education and to maximize pt's level of independence in the home and community environment.     Pt's spiritual, cultural and educational needs considered and pt agreeable to plan of care and goals.     Anticipated barriers to physical therapy: None    Goals:  Long Term Goals (LTG), 4 weeks.   Pt agrees to goals set. Eval date: 12/21/2022       Status   LTG: Patient will be independent with HEP emphasizing gaze stabilization and balance.  Education required Ongoing   LTG: Patient will exhibit improved Dizziness Handicap Inventory to 18 indicating decreased self-perceived disability related to dizziness. 28 - Mild Ongoing   LTG:  Patient will exhibit </= 2 line loss with Dynamic Visual Acuity testing, indicating improved gaze stabilization 6 line loss Ongoing   LTG: Patient will exhibit improved Functional Gait Assessment score to >/= 30 /30 indicating improved dynamic balance for increased safety ambulatory tasks 30/30 Ongoing   LTG: Patient will exhibit Motion Sensitivity Test within the Mild range, indicating improved motion tolerance. To be assessed Ongoing        PLAN     Cont with VOR, habituation and balance    Charis Montana PT

## 2022-12-29 ENCOUNTER — CLINICAL SUPPORT (OUTPATIENT)
Dept: REHABILITATION | Facility: HOSPITAL | Age: 62
End: 2022-12-29
Payer: COMMERCIAL

## 2022-12-29 DIAGNOSIS — R42 DIZZINESS: Primary | ICD-10-CM

## 2022-12-29 LAB
CLINICAL BIOCHEMIST REVIEW: NORMAL
PLPETH BLD-MCNC: 55 NG/ML
POPETH BLD-MCNC: 68 NG/ML
VIT B2 SERPL-MCNC: 3 MCG/L (ref 1–19)

## 2022-12-29 PROCEDURE — 97112 NEUROMUSCULAR REEDUCATION: CPT | Mod: PO

## 2022-12-30 LAB
PYRIDOXAL SERPL-MCNC: 10 UG/L (ref 5–50)
VIT B1 BLD-MCNC: 69 UG/L (ref 38–122)

## 2022-12-31 LAB — METHYLMALONATE SERPL-SCNC: 0.51 UMOL/L

## 2023-01-03 ENCOUNTER — PATIENT MESSAGE (OUTPATIENT)
Dept: NEUROLOGY | Facility: CLINIC | Age: 63
End: 2023-01-03
Payer: COMMERCIAL

## 2023-01-03 ENCOUNTER — PATIENT MESSAGE (OUTPATIENT)
Dept: DERMATOLOGY | Facility: CLINIC | Age: 63
End: 2023-01-03
Payer: COMMERCIAL

## 2023-01-03 NOTE — PROGRESS NOTES
[facility]   Physical Therapy Treatment Note     Name: lCinton Weller  Clinic Number: [id number]    Therapy Diagnosis:   Encounter Diagnosis   Name Primary?    Dizziness Yes         Physician: Deedee Wilkes P*    Visit Date: 1/4/2023    Physician Orders: PT Eval and Treat Vestibular Therapy   Medical Diagnosis from Referral: R42 (ICD-10-CM) - Dizziness H81.399 (ICD-10-CM) - Peripheral positional vertigo, unspecified laterality   Evaluation Date: 12/21/2022  Insurance Authorization Period: 01/01/2023 - 12/31/2023  Plan of Care Expiration: 02/03/2023  Visit # / Visits authorized: 1/1 pending additional authorization (3 total)    PTA Visit #: 0/5     Time In: 0759  Time Out: 0840  Total Billable Time: 41 minutes    Precautions: Standard, Pacemaker    SUBJECTIVE     Pt reports: that he has been feeling good. Dizziness/unsteadiness is getting better.   He was compliant with home exercise program.    Response to previous treatment: no problems  Functional change: ongoing    Pain: 0/10  Location: N/A      OBJECTIVE     Objective Measures updated at progress report unless specified.     Treatment     Clinton received the treatments listed below:      neuromuscular re-education activities to improve: Balance, Coordination, Kinesthetic, Sense, Proprioception, and Posture for 41 minutes. The following activities were included:    Standing:  3 x 30s, VORx1 - horizontal at 120 bpm - reports of occasional retinal slip  3 x 30s, VORx1 - vertical at 120 bpm  X 30s, VORx1 - horizontal - self paced  X 30s, VORx2 - vertical - self-paced    Hallway ambulation:  3 x 100 feet, Horizontal head turns  X 100 feet, Horizontal head turns - bwd  3 x 100 feet, Vertical head nods  X 100 feet, Vertical head nods - bwd  2 X 100 feet each plane, Diagonal plane head turns     Ambulation 40ft walkway: Min VCs for path maintenance   X 4 laps, Vision eliminated  X 2 laps, Vision eliminated with horizontal head  turns  X 2 laps, Vision eliminated with vertical head turns    // bars: standby by assist provided unless otherwise noted   X 6 laps, Tandem ambulation - no UE support  1 x 60s, Lateral weightshifting on rocker board, eyes open   1 x 60s, Lateral weightshifting on rocker board, eyes closed  1 x 60s, A/P weightshifting on rocker board, eyes open  1 x 60s, A/P weightshifting on rocker board, eyes closed  Airex foam pad: feet together    3 x 30s, Eyes closed   2 x 30s, Eyes closed with horizontal head turns   3 x 30s, Eyes closed with vertical head turns  [facility] - Contact guard assistance    2 x 30s, Eyes open with horizontal head turns   2 x 30s, Eyes open with vertical head turns    3 x 30s, Eyes closed    therapeutic activities to improve functional performance for 0  minutes, including:      Patient Education and Home Exercises     Home Exercises Provided and Patient Education Provided     Education provided:   - HEP    Written Home Exercises Provided: Patient instructed to cont prior HEP. Exercises were reviewed and Clinton was able to demonstrate them prior to the end of the session.  Clinton demonstrated good  understanding of the education provided. See EMR under Patient Instructions for exercises provided during therapy sessions    ASSESSMENT     Clinton tolerated today's session well exhibiting good participation motivation and engagement throughout session. He was appropriately challenged with progression of balance interventions today. With dynamic motion tolerance training, he exhibit mild unsteadiness with horizontal head turns and moderate unsteadiness with diagonal head turns as evidence by widened base of support and decreased walking speed. PT to continue per established plan of care, emphasizing gaze stabilization, motion tolerance, and balance training.     Clinton Is progressing well towards his goals.   Pt prognosis is Good.     Pt will continue to benefit from skilled outpatient physical therapy to address  the deficits listed in the problem list box on initial evaluation, provide pt/family education and to maximize pt's level of independence in the home and community environment.     Pt's spiritual, cultural and educational needs considered and pt agreeable to plan of care and goals.     Anticipated barriers to physical therapy: None    Goals:  Long Term Goals (LTG), 4 weeks.   Pt agrees to goals set. Eval date: 12/21/2022       Status   LTG: Patient will be independent with HEP emphasizing gaze stabilization and balance.  Education required Ongoing   LTG: Patient will exhibit improved Dizziness Handicap Inventory to 18 indicating decreased self-perceived disability related to dizziness. 28 - Mild Ongoing   LTG: Patient will exhibit </= 2 line loss with Dynamic Visual Acuity testing, indicating improved gaze stabilization 6 line loss Ongoing   LTG: Patient will exhibit improved Functional Gait Assessment score to >/= 30 /30 indicating improved dynamic balance for increased safety ambulatory tasks 30/30 Ongoing   LTG: Patient will exhibit Motion Sensitivity Test within the Mild range, indicating improved motion tolerance. To be assessed Ongoing        PLAN     Cont with VOR, habituation and balance.    Charis Montana, PT

## 2023-01-04 ENCOUNTER — CLINICAL SUPPORT (OUTPATIENT)
Dept: REHABILITATION | Facility: HOSPITAL | Age: 63
End: 2023-01-04
Attending: FAMILY MEDICINE
Payer: COMMERCIAL

## 2023-01-04 DIAGNOSIS — R42 DIZZINESS: Primary | ICD-10-CM

## 2023-01-04 PROCEDURE — 97112 NEUROMUSCULAR REEDUCATION: CPT | Mod: PO

## 2023-01-05 ENCOUNTER — PATIENT MESSAGE (OUTPATIENT)
Dept: FAMILY MEDICINE | Facility: CLINIC | Age: 63
End: 2023-01-05
Payer: COMMERCIAL

## 2023-01-05 ENCOUNTER — TELEPHONE (OUTPATIENT)
Dept: FAMILY MEDICINE | Facility: CLINIC | Age: 63
End: 2023-01-05
Payer: COMMERCIAL

## 2023-01-05 NOTE — PROGRESS NOTES
[facility]   Physical Therapy Treatment Note     Name: Clinton Weller  Clinic Number: [id number]    Therapy Diagnosis:   Encounter Diagnosis   Name Primary?    Dizziness Yes       Physician: Deedee Wilkes P*    Visit Date: 1/6/2023    Physician Orders: PT Eval and Treat Vestibular Therapy   Medical Diagnosis from Referral: R42 (ICD-10-CM) - Dizziness H81.399 (ICD-10-CM) - Peripheral positional vertigo, unspecified laterality   Evaluation Date: 12/21/2022  Insurance Authorization Period: 01/01/2023 - 12/31/2023  Plan of Care Expiration: 02/03/2023  Visit # / Visits authorized: 2/20 (4 total)    PTA Visit #: 0/5     Time In: 0702   Time Out: 0745   Total Billable Time: 43 minutes    Precautions: Standard, Pacemaker    SUBJECTIVE     Pt reports: that he has been feeling good.  He notices subtle improvements like decreased difficulty with showering and decreased difficulty going for walks.   He was compliant with home exercise program.    Response to previous treatment: no problems  Functional change: ongoing    Pain: 0/10  Location: N/A      OBJECTIVE       Long Term Goals (LTG), 4 weeks.   Pt agrees to goals set. Eval date: 12/21/2022 01/06/2023 Status   LTG: Patient will be independent with HEP emphasizing gaze stabilization and balance.  Education required Reports compliance. To be updated. Progressing   LTG: Patient will exhibit improved Dizziness Handicap Inventory to 18 indicating decreased self-perceived disability related to dizziness. 28 - Mild 4 - Mild Met 01/06/2023   LTG: Patient will exhibit </= 2 line loss with Dynamic Visual Acuity testing, indicating improved gaze stabilization 6 line loss 3 line loss Progressing   LTG: Patient will exhibit improved Functional Gait Assessment score to >/= 30 /30 indicating improved dynamic balance for increased safety ambulatory tasks 27/30 28/30 Progressing   LTG: Patient will exhibit Motion Sensitivity Test within the Mild  See AVS.   range, indicating improved motion tolerance. To be assessed 3.9  Met 01/06/2023      Functional Gait Assessment:   1. Gait on level surface =  3  2. Change in Gait Speed = 3  3. Gait with horizontal head turns  = 2  4. Gait with vertical head turns = 2  5. Gait with pivot turns = 3  6. Step over obstacle = 3  7. Gait with Narrow JARED = 3  8. Gait with eyes closed = 3  9. Ambulating Backwards = 3  10. Steps = 3    Score 28/30       Movement Intensity (0-10) Duration  <5s=0  5-10s=1  11-20s = 2  21-30s = 3  >30s = 4 Score   5x Horizontal head turns 0 0 0   2. 5x Vertical Head turns 0 0 0   3. 5x Right diagonal head turns (upper left down to right) 2 0 2   4. 5x Left diagonal head turns (upper right down to left) 1 0 1   5. 5x Trunk bends (bending knees reaching to floor) 0 0 0   6. 5x Right quarter body turns (Look over right shoulder with trunk rotation, feet planted) 1 0 1   7. 5x Left quarter body turns (Look over left shoulder with trunk rotation, feet planted) 4 0 4   8. 1x 360 degree turn to the right  0 0 0   9. 1x 360 degree turn to the left 3 0 3   10. 5x VOR cancellation (follow thumbs horizontally with head/trunk rotation x45 degrees each way) 0 0 0   Total score   11     mMSQ = Total score x (# of positions) / 14.00 = 11 x 5 / 14 = 3.9      Treatment     Clinton received the treatments listed below:      neuromuscular re-education activities to improve: Balance, Coordination, Kinesthetic, Sense, Proprioception, and Posture for 41 minutes. The following activities were included:    Standing: (feet apart, feet together, tandem)  3 x 45s, VORx1 - horizontal at 120 bpm   3 x 45s, VORx1 - vertical at 120 bpm    Standing: (feet together)  2 x 30s, VORx2 - horizontal - self paced  2 x 30s, VORx2 - vertical - self-paced    Hallway ambulation:  2 x 100 feet, Horizontal head turns - fwd  2 x 100 feet, Horizontal head turns - bwd  2 X 100 feet each plane, Diagonal plane head turns     // bars: standby by assist provided  unless otherwise noted   [facility] - Contact guard assistance    3 x 30s, Eyes closed   2 x 30s each way, Eyes open with diagonal plane head turns      therapeutic activities to improve functional performance for 0  minutes, including:      Patient Education and Home Exercises     Home Exercises Provided and Patient Education Provided     Education provided:   - HEP    Written Home Exercises Provided: Patient instructed to cont prior HEP. Exercises were reviewed and Clinton was able to demonstrate them prior to the end of the session.  Clinton demonstrated good  understanding of the education provided. See EMR under Patient Instructions for exercises provided during therapy sessions    ASSESSMENT   Reassessment period: 12/21/2023-1/6/2023. Pt reassessed for progress today. He exhibits good progress at this time and reports decreased dizziness. He reports decreased difficulty with closing his eyes to shower and with going for walks. Dynamic Visual Acuity has improved from a 6 line loss to a 3 line loss, indicating improved gaze stability. Functional Gait Assessment score remains good, but he does slow his gait with head turns resulting in a 28/30. Motion Sensitivity Quotient places him well within the Mild range, indicating very little motion sensitivity. He is progressing appropriately with vestibular rehab. PT to continue per established plan of care; discharge anticipated in 3 visits.        Clinton Is progressing well towards his goals.   Pt prognosis is Good.     Pt will continue to benefit from skilled outpatient physical therapy to address the deficits listed in the problem list box on initial evaluation, provide pt/family education and to maximize pt's level of independence in the home and community environment.     Pt's spiritual, cultural and educational needs considered and pt agreeable to plan of care and goals.     Anticipated barriers to physical therapy: None    Goals:  Long Term Goals (LTG), 4 weeks.   Pt agrees  to goals set. Eval date: 12/21/2022 01/06/2023 Status   LTG: Patient will be independent with HEP emphasizing gaze stabilization and balance.  Education required Reports compliance. To be updated. Progressing   LTG: Patient will exhibit improved Dizziness Handicap Inventory to 18 indicating decreased self-perceived disability related to dizziness. 28 - Mild 4 - Mild Met 01/06/2023   LTG: Patient will exhibit </= 2 line loss with Dynamic Visual Acuity testing, indicating improved gaze stabilization 6 line loss 3 line loss Progressing   LTG: Patient will exhibit improved Functional Gait Assessment score to >/= 30 /30 indicating improved dynamic balance for increased safety ambulatory tasks 27/30 28/30 Progressing   LTG: Patient will exhibit Motion Sensitivity Test within the Mild range, indicating improved motion tolerance. To be assessed 3.9  Met 01/06/2023      PLAN     Cont with VOR, habituation and balance.    Charis Montana, PT

## 2023-01-05 NOTE — TELEPHONE ENCOUNTER
----- Message from Xochitl Smith sent at 1/5/2023  9:56 AM CST -----  Regarding: Refill request  Type:  RX Refill Request    Who Called: POLO FROST [[id number]]    Refill or New Rx: New     RX Name and Strength: Ativan 10 Mg     How is the patient currently taking it? (ex. 1XDay) n/a    Is this a 30 day or 90 day RX: n/a     Preferred Pharmacy with phone number: [id number] - [place] - [address]    Local or Mail Order: local    Ordering Provider: previous provider in [place]     Best Call Back Number: [phone]    Additional Information:

## 2023-01-06 ENCOUNTER — CLINICAL SUPPORT (OUTPATIENT)
Dept: REHABILITATION | Facility: HOSPITAL | Age: 63
End: 2023-01-06
Payer: COMMERCIAL

## 2023-01-06 ENCOUNTER — PATIENT MESSAGE (OUTPATIENT)
Dept: TRANSPLANT | Facility: CLINIC | Age: 63
End: 2023-01-06
Payer: COMMERCIAL

## 2023-01-06 ENCOUNTER — OFFICE VISIT (OUTPATIENT)
Dept: FAMILY MEDICINE | Facility: CLINIC | Age: 63
End: 2023-01-06
Payer: COMMERCIAL

## 2023-01-06 VITALS
BODY MASS INDEX: 27.46 KG/M2 | WEIGHT: 220.88 LBS | DIASTOLIC BLOOD PRESSURE: 67 MMHG | OXYGEN SATURATION: 98 % | SYSTOLIC BLOOD PRESSURE: 138 MMHG | HEIGHT: 75 IN | HEART RATE: 68 BPM

## 2023-01-06 DIAGNOSIS — I42.2 HYPERTROPHIC CARDIOMYOPATHY: Primary | ICD-10-CM

## 2023-01-06 DIAGNOSIS — R42 DIZZINESS: Primary | ICD-10-CM

## 2023-01-06 DIAGNOSIS — Z95.810 IMPLANTABLE CARDIOVERTER-DEFIBRILLATOR (ICD) IN SITU: ICD-10-CM

## 2023-01-06 DIAGNOSIS — F41.0 PANIC ANXIETY SYNDROME: ICD-10-CM

## 2023-01-06 PROCEDURE — 3008F PR BODY MASS INDEX (BMI) DOCUMENTED: ICD-10-PCS | Mod: CPTII,S$GLB,, | Performed by: FAMILY MEDICINE

## 2023-01-06 PROCEDURE — 1159F PR MEDICATION LIST DOCUMENTED IN MEDICAL RECORD: ICD-10-PCS | Mod: CPTII,S$GLB,, | Performed by: FAMILY MEDICINE

## 2023-01-06 PROCEDURE — 99999 PR PBB SHADOW E&M-EST. PATIENT-LVL III: ICD-10-PCS | Mod: PBBFAC,,, | Performed by: FAMILY MEDICINE

## 2023-01-06 PROCEDURE — 99999 PR PBB SHADOW E&M-EST. PATIENT-LVL III: CPT | Mod: PBBFAC,,, | Performed by: FAMILY MEDICINE

## 2023-01-06 PROCEDURE — 1159F MED LIST DOCD IN RCRD: CPT | Mod: CPTII,S$GLB,, | Performed by: FAMILY MEDICINE

## 2023-01-06 PROCEDURE — 3075F PR MOST RECENT SYSTOLIC BLOOD PRESS GE 130-139MM HG: ICD-10-PCS | Mod: CPTII,S$GLB,, | Performed by: FAMILY MEDICINE

## 2023-01-06 PROCEDURE — 3078F DIAST BP <80 MM HG: CPT | Mod: CPTII,S$GLB,, | Performed by: FAMILY MEDICINE

## 2023-01-06 PROCEDURE — 3008F BODY MASS INDEX DOCD: CPT | Mod: CPTII,S$GLB,, | Performed by: FAMILY MEDICINE

## 2023-01-06 PROCEDURE — 99214 OFFICE O/P EST MOD 30 MIN: CPT | Mod: S$GLB,,, | Performed by: FAMILY MEDICINE

## 2023-01-06 PROCEDURE — 3075F SYST BP GE 130 - 139MM HG: CPT | Mod: CPTII,S$GLB,, | Performed by: FAMILY MEDICINE

## 2023-01-06 PROCEDURE — 99214 PR OFFICE/OUTPT VISIT, EST, LEVL IV, 30-39 MIN: ICD-10-PCS | Mod: S$GLB,,, | Performed by: FAMILY MEDICINE

## 2023-01-06 PROCEDURE — 97112 NEUROMUSCULAR REEDUCATION: CPT | Mod: PO

## 2023-01-06 PROCEDURE — 3078F PR MOST RECENT DIASTOLIC BLOOD PRESSURE < 80 MM HG: ICD-10-PCS | Mod: CPTII,S$GLB,, | Performed by: FAMILY MEDICINE

## 2023-01-06 RX ORDER — LORAZEPAM 0.5 MG/1
0.5 TABLET ORAL EVERY 8 HOURS PRN
Qty: 20 TABLET | Refills: 1 | Status: SHIPPED | OUTPATIENT
Start: 2023-01-06 | End: 2023-08-15

## 2023-01-06 NOTE — PROGRESS NOTES
Subjective:       Patient ID: Clinton Weller is a 62 y.o. male.    Chief Complaint: Panic Attack    HPI  Yesterday had episode of heart pounding hard and felt faint with dyspnea and felt tightness in pit of stomach with chest pressure.   Started while he was doing push-ups.    Did breathing and it resolved after 1/2 hour    H/o cardiac arrest in 2016. That had happened while exercising  Post trauma stress after that  Worked with psych and would taken ativan intemittently but last time he needed was over year year.     He has defibrillator. No discharge yesterday. He did send note to cardiology yesterday.    Has had some background anxiety/stress with work over the last year.     Tests to Keep You Healthy    Colon Cancer Screening: Met on 2022  Last Blood Pressure <= 139/89 (2023): NO      Social History     Social History Narrative    Not on file       History reviewed. No pertinent family history.    Current Outpatient Medications:     clobetasoL (TEMOVATE) 0.05 % cream, SMARTSI Topical Daily PRN, Disp: , Rfl:     furosemide (LASIX) 20 MG tablet, Take 1 tablet (20 mg total) by mouth daily as needed., Disp: 30 tablet, Rfl: 6    levocetirizine (XYZAL) 5 MG tablet, Take 1 tablet (5 mg total) by mouth every evening., Disp: 30 tablet, Rfl: 3    OTEZLA 30 mg Tab, Take 1 po bid, Disp: 60 tablet, Rfl: 2    LORazepam (ATIVAN) 0.5 MG tablet, Take 1 tablet (0.5 mg total) by mouth every 8 (eight) hours as needed., Disp: 20 tablet, Rfl: 1    methylPREDNISolone (MEDROL DOSEPACK) 4 mg tablet, use as directed (Patient not taking: Reported on 2023), Disp: 1 each, Rfl: 0    Review of Systems   Constitutional:  Negative for chills and fever.   Eyes:  Negative for visual disturbance.   Respiratory:  Negative for cough and shortness of breath.    Cardiovascular:  Negative for chest pain.   Gastrointestinal:  Negative for abdominal pain.   Neurological:  Negative for dizziness.     Objective:   /67 (BP Location:  "Left arm, Patient Position: Sitting, BP Method: Small (Automatic))   Pulse 68   Ht 6' 3" (1.905 m)   Wt 100.2 kg (220 lb 14.4 oz)   SpO2 98%   BMI 27.61 kg/m²      Physical Exam  Vitals reviewed.   Constitutional:       General: He is not in acute distress.     Appearance: He is well-developed. He is not diaphoretic.   HENT:      Head: Normocephalic and atraumatic.      Nose: Nose normal.   Eyes:      General:         Right eye: No discharge.         Left eye: No discharge.      Conjunctiva/sclera: Conjunctivae normal.      Pupils: Pupils are equal, round, and reactive to light.   Neck:      Thyroid: No thyromegaly.   Cardiovascular:      Rate and Rhythm: Normal rate and regular rhythm.      Heart sounds: Normal heart sounds. No murmur heard.  Pulmonary:      Effort: Pulmonary effort is normal. No respiratory distress.      Breath sounds: Normal breath sounds. No wheezing.   Abdominal:      General: There is no distension.      Palpations: Abdomen is soft.   Skin:     General: Skin is warm.      Findings: No rash.   Neurological:      Mental Status: He is alert and oriented to person, place, and time.   Psychiatric:         Behavior: Behavior normal.       Assessment & Plan     Problem List Items Addressed This Visit          Psychiatric    Panic anxiety syndrome    Current Assessment & Plan     Sending new script for ativan to have as needed.             Cardiac/Vascular    Implantable cardioverter-defibrillator (ICD) in situ    Overview     Last Assessment & Plan:   Formatting of this note might be different from the original.  ICD functioning well. Symptoms may be related to AAI -> DDD mode switch during activity. Will do treadmill with  in place to assess for this.  Formatting of this note might be different from the original.  Last Assessment & Plan:   ICD functioning well. Symptoms may be related to AAI -> DDD mode switch during activity. Will do treadmill with  in place to assess for " this.         Current Assessment & Plan     Sent note to cardiology requesting download of yesterday's activity to verify no concerning cardiac activity and and that his symptoms were secondary to panic like attack.              Immunizations Administered on Date of Encounter - 1/6/2023       No immunizations on file.             No follow-ups on file.      Disclaimer:  This note may have been prepared using voice recognition software, it may have not been extensively proofed, as such there could be errors within the text such as sound alike errors.

## 2023-01-06 NOTE — TELEPHONE ENCOUNTER
Faisal Osorio,   I assessed patient today in clinic. I'm wondering if we could get a remote download of yesterday's activity to be sure what he experienced was only anxiety related.    Thanks for your help.  Librado Farias

## 2023-01-06 NOTE — ASSESSMENT & PLAN NOTE
Sent note to cardiology requesting download of yesterday's activity to verify no concerning cardiac activity and and that his symptoms were secondary to panic like attack.

## 2023-01-06 NOTE — TELEPHONE ENCOUNTER
"1010 am:    Received the following message from pt this am via [employer] messaging  0831 am is the time sent:    [facility] Cardiology Team,     Just to let you all know, yesterday while working out in the gym, I experienced some familiar symptoms: it felt like my heart was beating really hard; like I was about to faint; it was really, really hard to breathe; and, a tightness right below the middle of my sternum, kind of where I imagine my solar plexus is. These are all symptoms that I felt right before my cardiac arrest in 2016.     These symptoms finally went away after about 1/2 hour. I did breathing exercises and rested - I did not get shocked by my defibrillator. This may have been a panic attack (I was diagnosed with post-trauma stress after the cardiac arrest).     I thought I should let you know that this happened. I feel fine today.     Rocio Benjamin.    1000 am:  When pt seen,  placed call to pt.  Informed pt of whom I am and office w/  and that I just saw and reviewed his message  pt told me he feels fine today and that he is currently in a waiting room awaiting to see a doctor.  Looked at pts appt schedule and asked if he was seeing a primary care doctor and said he was: in noted the appt note said anxiety.   Discussed pts report of symptoms as in his message and asked pt since these symptoms were concerning and as he stated: " they were all symptoms that he felt right before his cardiac arrest in 2016, did he seek medical attention to be evaluated -pt told me he did not  Said he relaxed, breating exercises were done and felt better.  I told pt in the future if these symptoms occur, he should immediately seem medical attention through an emergency room and explained why-especially as pt stated same symptoms as when he had a cardiac arrest  Pt seemed surprised by his tone of voice, but understood and said he would do so    Asked pt to please notify this office for future needs.   "

## 2023-01-06 NOTE — PROGRESS NOTES
Patient reported chest tightness while doing exercise in the gym. I am ordering stress test to investigate for underlying ischemia.

## 2023-01-09 ENCOUNTER — TELEPHONE (OUTPATIENT)
Dept: TRANSPLANT | Facility: CLINIC | Age: 63
End: 2023-01-09
Payer: COMMERCIAL

## 2023-01-09 DIAGNOSIS — F41.0 PANIC ANXIETY SYNDROME: Primary | ICD-10-CM

## 2023-01-09 NOTE — TELEPHONE ENCOUNTER
0730 am:  Received the following reply from Dr. Brito  Also noted he sent a reply directly to the pt regarding his recommendation for pt to have a stress test done as well as pt should call and see his device Md    I reviewed chart and noted stress test not yet scheduled -message was from late Friday afternoon to appt coord.  I will follow       ----- Message from Bubba Osorio MD sent at 1/6/2023  4:03 PM CST -----  Regarding: stress test  Hi team:  Please help me to schedule this patient for stress test.  I ordered the test.  He should also follow up with his EP for any device interrogation.  Thank you    ISRAEL

## 2023-01-09 NOTE — PROGRESS NOTES
[facility]   Physical Therapy Treatment Note     Name: Clinton Weller  Clinic Number: [id number]    Therapy Diagnosis:   Encounter Diagnosis   Name Primary?    Dizziness Yes         Physician: Deedee Wilkes P*    Visit Date: 1/10/2023    Physician Orders: PT Eval and Treat Vestibular Therapy   Medical Diagnosis from Referral: R42 (ICD-10-CM) - Dizziness H81.399 (ICD-10-CM) - Peripheral positional vertigo, unspecified laterality   Evaluation Date: 12/21/2022  Insurance Authorization Period: 01/01/2023 - 12/31/2023  Plan of Care Expiration: 02/03/2023  Visit # / Visits authorized: 3/20 (5 total)    PTA Visit #: 0/5     Time In: 0800  Time Out: 0832   Total Billable Time: 32 minutes    Precautions: Standard, Pacemaker    SUBJECTIVE     Pt reports: that he has been feeling great. He and his wife are noticing a difference.   He was compliant with home exercise program.    Response to previous treatment: no problems  Functional change: ongoing    Pain: 0/10  Location: N/A      OBJECTIVE     No objective measurements taken this date.     Treatment     Clinton received the treatments listed below:      neuromuscular re-education activities to improve: Balance, Coordination, Kinesthetic, Sense, Proprioception, and Posture for 32 minutes. The following activities were included:    Standing: (feet together)  2 x 30s, VORx2 - horizontal - self paced - simple target  2 x 30s, VORx2 - vertical - self-paced - simple target  2 x 30s, VORx2 - horizontal - self paced with striped target  2 x 30s, VORx2 - vertical - self paced with striped target    Ambulation around neuro gym:  X 2 laps, Horizontal head turns    Hallway ambulation:  2 x 100 feet, Horizontal head turns - fwd  2 x 100 feet, Horizontal head turns - bwd  2 x 100 feet, VORx1 horizontal - fwd only  2 x 100 feet, VORx1 vertical - fwd only  2 X 100 feet each plane, Diagonal plane head turns   2 x 100 feet, turn to catch/toss ball with PT  2  x 100 feet, trunk twist passing mini basketball with PT positioned behind    // bars: standby by assist provided unless otherwise noted   Airex: Feet together   3 x 30s, horizontal head turns   3 x 30s, vertical head turns   2 x 30s each plane, diagonal head turns  Tandem on floor:  X 30 sec each LE back, Static stance  X 30 sec each LE back, vertical head turns  X 30 sec each LE back, horizontal head turns  2 x 30 sec each LE back, eyes closed      therapeutic activities to improve functional performance for 0  minutes, including:      Patient Education and Home Exercises     Home Exercises Provided and Patient Education Provided     Education provided:   - HEP    Written Home Exercises Provided: Patient instructed to cont prior HEP. Exercises were reviewed and Clinton was able to demonstrate them prior to the end of the session.  Clinton demonstrated good  understanding of the education provided. See EMR under Patient Instructions for exercises provided during therapy sessions    ASSESSMENT   Clinton tolerated today's session very well and reported feeling great upon completion. Very slight unsteadiness noted with gait with horizontal head turns and diagonal head turns. He met challenges with balance interventions well. PT to update HEP next visit in preparation for upcoming discharge.       Clinton Is progressing well towards his goals.   Pt prognosis is Good.     Pt will continue to benefit from skilled outpatient physical therapy to address the deficits listed in the problem list box on initial evaluation, provide pt/family education and to maximize pt's level of independence in the home and community environment.     Pt's spiritual, cultural and educational needs considered and pt agreeable to plan of care and goals.     Anticipated barriers to physical therapy: None    Goals:  Long Term Goals (LTG), 4 weeks.   Pt agrees to goals set. Eval date: 12/21/2022 01/06/2023 Status   LTG: Patient will be independent  with HEP emphasizing gaze stabilization and balance.  Education required Reports compliance. To be updated. Progressing   LTG: Patient will exhibit improved Dizziness Handicap Inventory to 18 indicating decreased self-perceived disability related to dizziness. 28 - Mild 4 - Mild Met 01/06/2023   LTG: Patient will exhibit </= 2 line loss with Dynamic Visual Acuity testing, indicating improved gaze stabilization 6 line loss 3 line loss Progressing   LTG: Patient will exhibit improved Functional Gait Assessment score to >/= 30 /30 indicating improved dynamic balance for increased safety ambulatory tasks 27/30 28/30 Progressing   LTG: Patient will exhibit Motion Sensitivity Test within the Mild range, indicating improved motion tolerance. To be assessed 3.9  Met 01/06/2023      PLAN     Cont with VOR, habituation and balance.    Charis Montana PT

## 2023-01-10 ENCOUNTER — CLINICAL SUPPORT (OUTPATIENT)
Dept: REHABILITATION | Facility: HOSPITAL | Age: 63
End: 2023-01-10
Payer: COMMERCIAL

## 2023-01-10 DIAGNOSIS — I48.0 PAROXYSMAL ATRIAL FIBRILLATION: Primary | ICD-10-CM

## 2023-01-10 DIAGNOSIS — R42 DIZZINESS: Primary | ICD-10-CM

## 2023-01-10 PROCEDURE — 97112 NEUROMUSCULAR REEDUCATION: CPT | Mod: PO

## 2023-01-12 NOTE — PROGRESS NOTES
"[facility]   Physical Therapy Treatment Note     Name: Clinton Weller  Clinic Number: [id number]    Therapy Diagnosis:   Encounter Diagnosis   Name Primary?    Dizziness Yes     Physician: Deedee Wilkes P*    Visit Date: 1/13/2023    Physician Orders: PT Eval and Treat Vestibular Therapy   Medical Diagnosis from Referral: R42 (ICD-10-CM) - Dizziness H81.399 (ICD-10-CM) - Peripheral positional vertigo, unspecified laterality   Evaluation Date: 12/21/2022  Insurance Authorization Period: 01/01/2023 - 12/31/2023  Plan of Care Expiration: 02/03/2023  Visit # / Visits authorized: 4/20 (6 total)    PTA Visit #: 0/5     Time In: 0750   Time Out: 0828  Total Billable Time: 38 minutes    Precautions: Standard, Pacemaker    SUBJECTIVE     Pt reports: feeling "fantastic". No issues at all including with HEP.   He was compliant with home exercise program.    Response to previous treatment: no problems  Functional change: ongoing    Pain: 0/10  Location: N/A      OBJECTIVE     No objective measurements taken this date.     Treatment     Clinton received the treatments listed below:      neuromuscular re-education activities to improve: Balance, Coordination, Kinesthetic, Sense, Proprioception, and Posture for 38 minutes. The following activities were included:    Standing: (feet together)  1 x 45s, VORx2 - horizontal - self paced - striped target  1 x 45s, VORx2 - vertical - self-paced - striped target  1 x 45s, VORx2 - horizontal - self paced - checkered target  1 x 45s, VORx2 - vertical - self-paced - checkered target  1 x 30s, VORx1 - 240 bpm - simple target    Ambulation around neuro gym:  X 2 laps, Horizontal head turns  X 2 laps, Vertical head turns  X 2 laps each plane, Diagonal plane head turns    Hallway ambulation:  2 x 100 feet, VORx1 horizontal - fwd only  2 x 100 feet, VORx1 vertical - fwd/bwd  2 x 100 feet, VORx2 horizontal - fwd only  2 x 100 feet, VORx2 vertical - fwd only    // " bars: standby by assist provided unless otherwise noted   Airex: Feet together, eyes closed   X 30s, Static stance   X 30s, horizontal head turns  X 30s, vertical head turns   X 30s each plane, diagonal head turns  Tandem on floor:  X 30 sec each LE back, Static stance  X 30 sec each LE back, horizontal head turns  2 x 30 sec each LE back, eyes closed    Bean bag toss:  X 12 reps each way, Catching from PT in front on him <> tossing in crate behind him  X 12 reps each way, Bending to retrieve from crate on floor in front on him <> tossing in crate behind him    therapeutic activities to improve functional performance for 0  minutes, including:      Patient Education and Home Exercises     Home Exercises Provided and Patient Education Provided     Education provided:   - HEP    Written Home Exercises Provided: Patient instructed to cont prior HEP. Exercises were reviewed and Clinton was able to demonstrate them prior to the end of the session.  Clinton demonstrated good  understanding of the education provided. See EMR under Patient Instructions for exercises provided during therapy sessions    ASSESSMENT   Clinton tolerated today's session very well; no dizziness reported throughout session. He exhibited appropriate challenge with balance interventions; no significant instability noted. Complex motion tolerance interventions did not provoke dizziness. HEP was updated and reviewed with patient; good understanding noted. Patient is appropriate for discharge next visit.       Clinton Is progressing well towards his goals.   Pt prognosis is Good.     Pt will continue to benefit from skilled outpatient physical therapy to address the deficits listed in the problem list box on initial evaluation, provide pt/family education and to maximize pt's level of independence in the home and community environment.     Pt's spiritual, cultural and educational needs considered and pt agreeable to plan of care and goals.     Anticipated  barriers to physical therapy: None    Goals:  Long Term Goals (LTG), 4 weeks.   Pt agrees to goals set. Eval date: 12/21/2022 01/06/2023 Status   LTG: Patient will be independent with HEP emphasizing gaze stabilization and balance.  Education required Reports compliance. To be updated. Progressing   LTG: Patient will exhibit improved Dizziness Handicap Inventory to 18 indicating decreased self-perceived disability related to dizziness. 28 - Mild 4 - Mild Met 01/06/2023   LTG: Patient will exhibit </= 2 line loss with Dynamic Visual Acuity testing, indicating improved gaze stabilization 6 line loss 3 line loss Progressing   LTG: Patient will exhibit improved Functional Gait Assessment score to >/= 30 /30 indicating improved dynamic balance for increased safety ambulatory tasks 27/30 28/30 Progressing   LTG: Patient will exhibit Motion Sensitivity Test within the Mild range, indicating improved motion tolerance. To be assessed 3.9  Met 01/06/2023      PLAN     Cont with VOR, habituation and balance.    Charis Montana, PT

## 2023-01-13 ENCOUNTER — CLINICAL SUPPORT (OUTPATIENT)
Dept: REHABILITATION | Facility: HOSPITAL | Age: 63
End: 2023-01-13
Payer: COMMERCIAL

## 2023-01-13 DIAGNOSIS — R42 DIZZINESS: Primary | ICD-10-CM

## 2023-01-13 PROCEDURE — 97112 NEUROMUSCULAR REEDUCATION: CPT | Mod: PO

## 2023-01-13 NOTE — PROGRESS NOTES
"[facility]   Physical Therapy Treatment Note     Name: Clinton Weller  Clinic Number: [id number]    Therapy Diagnosis:   Encounter Diagnosis   Name Primary?    Dizziness Yes       Physician: Deedee Wilkes P*    Visit Date: 1/17/2023    Physician Orders: PT Eval and Treat Vestibular Therapy   Medical Diagnosis from Referral: R42 (ICD-10-CM) - Dizziness H81.399 (ICD-10-CM) - Peripheral positional vertigo, unspecified laterality   Evaluation Date: 12/21/2022  Insurance Authorization Period: 01/01/2023 - 12/31/2023  Plan of Care Expiration: 02/03/2023  Visit # / Visits authorized: 5/20 (7 total)    PTA Visit #: 0/5     Time In: 0845   Time Out: 0908  Total Billable Time: 23 minutes    Precautions: Standard, Pacemaker    SUBJECTIVE     Pt reports: feeling "fantastic". No issues at all including with HEP. He went swing dancing with his wife with no issues.   He was compliant with home exercise program.    Response to previous treatment: no problems  Functional change: ongoing    Pain: 0/10  Location: N/A      OBJECTIVE       Long Term Goals (LTG), 4 weeks.   Pt agrees to goals set. Eval date: 12/21/2022 01/06/2023 01/17/2023   LTG: Patient will be independent with HEP emphasizing gaze stabilization and balance.  Education required Reports compliance. To be updated. Compliant.    LTG: Patient will exhibit improved Dizziness Handicap Inventory to 18 indicating decreased self-perceived disability related to dizziness. 28 - Mild 4 - Mild 4 - Mild   LTG: Patient will exhibit </= 2 line loss with Dynamic Visual Acuity testing, indicating improved gaze stabilization 6 line loss 3 line loss 3 line loss    LTG: Patient will exhibit improved Functional Gait Assessment score to >/= 30 /30 indicating improved dynamic balance for increased safety ambulatory tasks 27/30 28/30 30/30   LTG: Patient will exhibit Motion Sensitivity Test within the Mild range, indicating improved motion tolerance. " "To be assessed 3.9  DNT      Functional Gait Assessment:   1. Gait on level surface =  3  2. Change in Gait Speed = 3  3. Gait with horizontal head turns  = 3  4. Gait with vertical head turns = 3  5. Gait with pivot turns = 3  6. Step over obstacle = 3  7. Gait with Narrow JARED = 3  8. Gait with eyes closed = 3  9. Ambulating Backwards = 3  10. Steps = 3    Score 30/30       Treatment     Clinton received the treatments listed below:      neuromuscular re-education activities to improve: Balance, Coordination, Kinesthetic, Sense, Proprioception, and Posture for 23 minutes. The following activities were included:    Includes time for objective measurements. See above.     Standing: (feet together)  1 x 45s, VORx2 - horizontal - 120 bpm- simple target  1 x 45s, VORx2 - vertical - 120 bpm - simple target  1 x 45s, VORx1 - 150 bpm - simple target    Hallway ambulation:  2 x 100 feet each way, Diagonal plane head turns    therapeutic activities to improve functional performance for 0  minutes, including:      Patient Education and Home Exercises     Home Exercises Provided and Patient Education Provided     Education provided:   - HEP    Written Home Exercises Provided: Patient instructed to cont prior HEP. Exercises were reviewed and Clinton was able to demonstrate them prior to the end of the session.  Clinton demonstrated good  understanding of the education provided. See EMR under Patient Instructions for exercises provided during therapy sessions      PHYSICAL THERAPY ASSESSMENT & DISCHARGE SUMMARY     Clinton participated in outpatient PT x 7 visits. He demonstrates excellent overall progress. Improvements noted in gaze stabilization and dynamic balance. His Motion Sensitivity Quotient places him well within the "Mild" motion sensitivity range. Most importantly, Clinton reports improvement of dizziness/imbalance symptoms since onset of PT. He reports decreased disorientation with closing his eyes in the shower and states " that he went swing dancing with his wife this weekend without issue. Clinton was provided with an updated home exercise program last visit which he states has been going well. He was educated on appropriate progression of exercises and when he can discontinue the exercises. At this time, Clinton is appropriate for discharge at this time.       Long Term Goals (LTG), 4 weeks.   Pt agrees to goals set. Eval date: 12/21/2022 01/06/2023 01/17/2023 Status   LTG: Patient will be independent with HEP emphasizing gaze stabilization and balance.  Education required Reports compliance. To be updated. Compliant.  Met 01/17/2023   LTG: Patient will exhibit improved Dizziness Handicap Inventory to 18 indicating decreased self-perceived disability related to dizziness. 28 - Mild 4 - Mild 4 - Mild Met 01/06/2023   LTG: Patient will exhibit </= 2 line loss with Dynamic Visual Acuity testing, indicating improved gaze stabilization 6 line loss 3 line loss 3 line loss  Improved, Not Met   LTG: Patient will exhibit improved Functional Gait Assessment score to >/= 30 /30 indicating improved dynamic balance for increased safety ambulatory tasks 27/30 28/30 30/30 Met 01/17/2023   LTG: Patient will exhibit Motion Sensitivity Test within the Mild range, indicating improved motion tolerance. To be assessed 3.9  DNT Met 01/06/2023        Discharge reason: Met goals    PLAN   This patient is discharged from Outpatient Physical Therapy Services.     Charis Montana, PT  01/17/2023

## 2023-01-17 ENCOUNTER — CLINICAL SUPPORT (OUTPATIENT)
Dept: REHABILITATION | Facility: HOSPITAL | Age: 63
End: 2023-01-17
Payer: COMMERCIAL

## 2023-01-17 DIAGNOSIS — R42 DIZZINESS: Primary | ICD-10-CM

## 2023-01-17 PROCEDURE — 97112 NEUROMUSCULAR REEDUCATION: CPT | Mod: PO

## 2023-01-17 NOTE — PLAN OF CARE
"  PHYSICAL THERAPY ASSESSMENT & DISCHARGE SUMMARY     Clinton participated in outpatient PT x 7 visits. He demonstrates excellent overall progress. Improvements noted in gaze stabilization and dynamic balance. His Motion Sensitivity Quotient places him well within the "Mild" motion sensitivity range. Most importantly, Clinton reports improvement of dizziness/imbalance symptoms since onset of PT. He reports decreased disorientation with closing his eyes in the shower and states that he went swing dancing with his wife this weekend without issue. Clinton was provided with an updated home exercise program last visit which he states has been going well. He was educated on appropriate progression of exercises and when he can discontinue the exercises. At this time, Clinton is appropriate for discharge at this time.       Long Term Goals (LTG), 4 weeks.   Pt agrees to goals set. Eval date: 12/21/2022 01/06/2023 01/17/2023 Status   LTG: Patient will be independent with HEP emphasizing gaze stabilization and balance.  Education required Reports compliance. To be updated. Compliant.  Met 01/17/2023   LTG: Patient will exhibit improved Dizziness Handicap Inventory to 18 indicating decreased self-perceived disability related to dizziness. 28 - Mild 4 - Mild 4 - Mild Met 01/06/2023   LTG: Patient will exhibit </= 2 line loss with Dynamic Visual Acuity testing, indicating improved gaze stabilization 6 line loss 3 line loss 3 line loss  Improved, Not Met   LTG: Patient will exhibit improved Functional Gait Assessment score to >/= 30 /30 indicating improved dynamic balance for increased safety ambulatory tasks 27/30 28/30 30/30 Met 01/17/2023   LTG: Patient will exhibit Motion Sensitivity Test within the Mild range, indicating improved motion tolerance. To be assessed 3.9  DNT Met 01/06/2023        Discharge reason: Met goals    PLAN   This patient is discharged from Outpatient Physical Therapy Services.     Charis Montana " PT  01/17/2023

## 2023-01-30 ENCOUNTER — OFFICE VISIT (OUTPATIENT)
Dept: ELECTROPHYSIOLOGY | Facility: CLINIC | Age: 63
End: 2023-01-30
Payer: COMMERCIAL

## 2023-01-30 ENCOUNTER — CLINICAL SUPPORT (OUTPATIENT)
Dept: CARDIOLOGY | Facility: HOSPITAL | Age: 63
End: 2023-01-30
Attending: STUDENT IN AN ORGANIZED HEALTH CARE EDUCATION/TRAINING PROGRAM
Payer: COMMERCIAL

## 2023-01-30 ENCOUNTER — TELEPHONE (OUTPATIENT)
Dept: ELECTROPHYSIOLOGY | Facility: CLINIC | Age: 63
End: 2023-01-30
Payer: COMMERCIAL

## 2023-01-30 ENCOUNTER — HOSPITAL ENCOUNTER (OUTPATIENT)
Dept: CARDIOLOGY | Facility: CLINIC | Age: 63
Discharge: HOME | End: 2023-01-30
Attending: STUDENT IN AN ORGANIZED HEALTH CARE EDUCATION/TRAINING PROGRAM
Payer: COMMERCIAL

## 2023-01-30 VITALS
WEIGHT: 221.56 LBS | SYSTOLIC BLOOD PRESSURE: 110 MMHG | OXYGEN SATURATION: 96 % | HEART RATE: 63 BPM | HEIGHT: 75 IN | BODY MASS INDEX: 27.55 KG/M2 | DIASTOLIC BLOOD PRESSURE: 68 MMHG

## 2023-01-30 DIAGNOSIS — I48.0 PAROXYSMAL ATRIAL FIBRILLATION: ICD-10-CM

## 2023-01-30 DIAGNOSIS — Z95.810 IMPLANTABLE CARDIOVERTER-DEFIBRILLATOR (ICD) IN SITU: ICD-10-CM

## 2023-01-30 DIAGNOSIS — I44.2 COMPLETE HEART BLOCK: ICD-10-CM

## 2023-01-30 DIAGNOSIS — Z98.890 HISTORY OF CARDIAC RADIOFREQUENCY ABLATION: ICD-10-CM

## 2023-01-30 DIAGNOSIS — I47.20 VT (VENTRICULAR TACHYCARDIA): Primary | ICD-10-CM

## 2023-01-30 DIAGNOSIS — Z95.810 ICD (IMPLANTABLE CARDIOVERTER-DEFIBRILLATOR) IN PLACE: ICD-10-CM

## 2023-01-30 DIAGNOSIS — R79.89 ELEVATED HOMOCYSTEINE: ICD-10-CM

## 2023-01-30 DIAGNOSIS — I42.2 HYPERTROPHIC CARDIOMYOPATHY: ICD-10-CM

## 2023-01-30 DIAGNOSIS — G47.33 OSA (OBSTRUCTIVE SLEEP APNEA): ICD-10-CM

## 2023-01-30 DIAGNOSIS — Z15.89 HETEROZYGOUS MTHFR MUTATION A1298C: ICD-10-CM

## 2023-01-30 DIAGNOSIS — E66.3 OVERWEIGHT (BMI 25.0-29.9): ICD-10-CM

## 2023-01-30 DIAGNOSIS — I46.9 CARDIAC ARREST: ICD-10-CM

## 2023-01-30 DIAGNOSIS — I47.19 AVNRT (AV NODAL RE-ENTRY TACHYCARDIA): ICD-10-CM

## 2023-01-30 DIAGNOSIS — I42.1 HYPERTROPHIC OBSTRUCTIVE CARDIOMYOPATHY (HOCM): ICD-10-CM

## 2023-01-30 DIAGNOSIS — I47.10 SVT (SUPRAVENTRICULAR TACHYCARDIA): ICD-10-CM

## 2023-01-30 DIAGNOSIS — F41.9 ANXIETY: ICD-10-CM

## 2023-01-30 DIAGNOSIS — I47.20 VENTRICULAR TACHYCARDIA: ICD-10-CM

## 2023-01-30 PROCEDURE — 93283 CARDIAC DEVICE CHECK - IN CLINIC & HOSPITAL: ICD-10-PCS | Mod: 26,,, | Performed by: STUDENT IN AN ORGANIZED HEALTH CARE EDUCATION/TRAINING PROGRAM

## 2023-01-30 PROCEDURE — 93005 ELECTROCARDIOGRAM TRACING: CPT | Mod: S$GLB,,, | Performed by: STUDENT IN AN ORGANIZED HEALTH CARE EDUCATION/TRAINING PROGRAM

## 2023-01-30 PROCEDURE — 93283 PRGRMG EVAL IMPLANTABLE DFB: CPT | Mod: 26,,, | Performed by: STUDENT IN AN ORGANIZED HEALTH CARE EDUCATION/TRAINING PROGRAM

## 2023-01-30 PROCEDURE — 99205 OFFICE O/P NEW HI 60 MIN: CPT | Mod: S$GLB,,, | Performed by: STUDENT IN AN ORGANIZED HEALTH CARE EDUCATION/TRAINING PROGRAM

## 2023-01-30 PROCEDURE — 1159F MED LIST DOCD IN RCRD: CPT | Mod: CPTII,S$GLB,, | Performed by: STUDENT IN AN ORGANIZED HEALTH CARE EDUCATION/TRAINING PROGRAM

## 2023-01-30 PROCEDURE — 93005 RHYTHM STRIP: ICD-10-PCS | Mod: S$GLB,,, | Performed by: STUDENT IN AN ORGANIZED HEALTH CARE EDUCATION/TRAINING PROGRAM

## 2023-01-30 PROCEDURE — 3008F PR BODY MASS INDEX (BMI) DOCUMENTED: ICD-10-PCS | Mod: CPTII,S$GLB,, | Performed by: STUDENT IN AN ORGANIZED HEALTH CARE EDUCATION/TRAINING PROGRAM

## 2023-01-30 PROCEDURE — 99999 PR PBB SHADOW E&M-EST. PATIENT-LVL III: ICD-10-PCS | Mod: PBBFAC,,, | Performed by: STUDENT IN AN ORGANIZED HEALTH CARE EDUCATION/TRAINING PROGRAM

## 2023-01-30 PROCEDURE — 3074F SYST BP LT 130 MM HG: CPT | Mod: CPTII,S$GLB,, | Performed by: STUDENT IN AN ORGANIZED HEALTH CARE EDUCATION/TRAINING PROGRAM

## 2023-01-30 PROCEDURE — 3008F BODY MASS INDEX DOCD: CPT | Mod: CPTII,S$GLB,, | Performed by: STUDENT IN AN ORGANIZED HEALTH CARE EDUCATION/TRAINING PROGRAM

## 2023-01-30 PROCEDURE — 3078F PR MOST RECENT DIASTOLIC BLOOD PRESSURE < 80 MM HG: ICD-10-PCS | Mod: CPTII,S$GLB,, | Performed by: STUDENT IN AN ORGANIZED HEALTH CARE EDUCATION/TRAINING PROGRAM

## 2023-01-30 PROCEDURE — 99205 PR OFFICE/OUTPT VISIT, NEW, LEVL V, 60-74 MIN: ICD-10-PCS | Mod: S$GLB,,, | Performed by: STUDENT IN AN ORGANIZED HEALTH CARE EDUCATION/TRAINING PROGRAM

## 2023-01-30 PROCEDURE — 93010 ELECTROCARDIOGRAM REPORT: CPT | Mod: S$GLB,,, | Performed by: INTERNAL MEDICINE

## 2023-01-30 PROCEDURE — 1159F PR MEDICATION LIST DOCUMENTED IN MEDICAL RECORD: ICD-10-PCS | Mod: CPTII,S$GLB,, | Performed by: STUDENT IN AN ORGANIZED HEALTH CARE EDUCATION/TRAINING PROGRAM

## 2023-01-30 PROCEDURE — 93283 PRGRMG EVAL IMPLANTABLE DFB: CPT

## 2023-01-30 PROCEDURE — 99999 PR PBB SHADOW E&M-EST. PATIENT-LVL III: CPT | Mod: PBBFAC,,, | Performed by: STUDENT IN AN ORGANIZED HEALTH CARE EDUCATION/TRAINING PROGRAM

## 2023-01-30 PROCEDURE — 93010 RHYTHM STRIP: ICD-10-PCS | Mod: S$GLB,,, | Performed by: INTERNAL MEDICINE

## 2023-01-30 PROCEDURE — 3078F DIAST BP <80 MM HG: CPT | Mod: CPTII,S$GLB,, | Performed by: STUDENT IN AN ORGANIZED HEALTH CARE EDUCATION/TRAINING PROGRAM

## 2023-01-30 PROCEDURE — 3074F PR MOST RECENT SYSTOLIC BLOOD PRESSURE < 130 MM HG: ICD-10-PCS | Mod: CPTII,S$GLB,, | Performed by: STUDENT IN AN ORGANIZED HEALTH CARE EDUCATION/TRAINING PROGRAM

## 2023-01-30 NOTE — PROGRESS NOTES
Electrophysiology Clinic Note    Reason for new patient visit: Establish care in the [facility] EP clinic for ongoing evaluation and recommendations regarding a history of hypertrophic cardiomyopathy with prior VT, s/p implantation of a secondary prevention ICD.     PRESENTING HISTORY:     History of Present Illness:  Mr. Clinton Weller is a daksha 62-year-old gentleman who presents today to establish care in the [facility] EP clinic for ongoing evaluation and recommendations regarding a history of mid-apical hypertrophic cardiomyopathy with prior VT, s/p implantation of a secondary prevention ICD. He has a past medical history significant for hypertrophic cardiomyopathy with two previous alcohol septal ablations in 2005 and 2006 at [facility], complicated by postoperative complete heart block, a history of VT s/p implantation of a secondary prevention Medtronic dual-chamber ICD 3/6/2006 with generator change on 9/17/2018, a history of AVNRT s/p slow pathway modification in 2014, a history of MMVT with ineffective shocks, s/p successful RFA ablation of the LV septum 7/12/2016 at [facility] with postablative DFTs effective at 20J, chronic underlying LBBB, COOPER maintained on CPAP therapy, anxiety, and overweight BMI.     He recently moved to [place] from [place], [place]. He was previously followed with [facility] with Dr. Anibal López - full records have been scanned into his media tab for review. He tells me that since moving to [place], he has been doing overall quite well. He occasionally reports episodes of heart racing, most notably with the onset of exercise - he states that his device was made rate responsive at a prior interrogation and that following this reprogramming amrita bowen that these symptoms have improved. He continues to be very physically active, doing resistance training, cardio, and light strength exercises 3-4 days per week. He has a history of a prior arrest that occurred in 2016 with  exercise - this was noted to be a VT arrest with failed shocks in 6/2016, followed by a successful VT ablation and repeat DFTs that were within normal parameters. He has not had any subsequent VT/VF recorded on device interrogations since this event. He reports that he continues to work on his anxiety.     In discussion with Mr. Weller today, he tells me that he is feeling overall quite well. He denies any episodes of dizziness, lightheadedness, syncope/presyncope, chest pain or chest discomfort, palpitations other than those noted with exercise initiation and termination that likely correspond to rate response, nausea or vomiting, orthopnea, lower extremity edema, or PND. He reports mild baseline shortness of breath and dyspnea with exertion that he feels has remained stable. He can climb more than one flight of stairs prior to needing to take a break and continues to attempt to increase his level of physical activity as above.     Review of Systems:  Review of Systems   Constitutional:  Negative for activity change.   HENT:  Negative for nasal congestion, nosebleeds, postnasal drip, rhinorrhea, sinus pressure/congestion, sneezing and sore throat.    Respiratory:  Positive for shortness of breath. Negative for apnea, cough, chest tightness and wheezing.    Cardiovascular:  Positive for palpitations. Negative for chest pain and leg swelling.   Gastrointestinal:  Negative for abdominal distention, abdominal pain, blood in stool, change in bowel habit, constipation, diarrhea, nausea, vomiting and change in bowel habit.   Genitourinary:  Negative for dysuria and hematuria.   Musculoskeletal:  Positive for arthralgias and back pain. Negative for gait problem.   Neurological:  Negative for dizziness, seizures, syncope, weakness, light-headedness, headaches, coordination difficulties and coordination difficulties.      PAST HISTORY:     Past Medical History:   Diagnosis Date    AICD (automatic  "cardioverter/defibrillator) present     Hypertrophic cardiomyopathy     Pacemaker     Psoriasis        Past Surgical History:   Procedure Laterality Date    COLONOSCOPY N/A 2022    Procedure: COLONOSCOPY;  Surgeon: Fran Zaragoza MD;  Location: [facility] ([address] [place] [address]);  Service: Endoscopy;  Laterality: N/A;  Fully vaccinated/ inst emailed-RB    precall MEDTR[employer]IC PACEMAKER DEPENDENT- JLM       Family History:  No family history of known HCM, no reported history of SCD.    Social History:  He  reports that he has quit smoking. He has never used smokeless tobacco. He reports current alcohol use of about 5.0 standard drinks per week. He reports that he does not use drugs.      MEDICATIONS & ALLERGIES:     Review of patient's allergies indicates:   Allergen Reactions    Penicillins Dermatitis, Rash, Shortness Of Breath, Hives and Other (See Comments)    Sulfa (sulfonamide antibiotics) Shortness Of Breath, Other (See Comments), Rash and Hives     Other reaction(s): Rash, Unspecified  Other reaction(s): Rash       Current Outpatient Medications on File Prior to Visit   Medication Sig Dispense Refill    clobetasoL (TEMOVATE) 0.05 % cream SMARTSI Topical Daily PRN      furosemide (LASIX) 20 MG tablet Take 1 tablet (20 mg total) by mouth daily as needed. 30 tablet 6    levocetirizine (XYZAL) 5 MG tablet Take 1 tablet (5 mg total) by mouth every evening. 30 tablet 3    LORazepam (ATIVAN) 0.5 MG tablet Take 1 tablet (0.5 mg total) by mouth every 8 (eight) hours as needed. 20 tablet 1    OTEZLA 30 mg Tab TAKE 1 TABLET BY MOUTH 2 TIMES A DAY. 60 tablet 3     No current facility-administered medications on file prior to visit.        OBJECTIVE:     Vital Signs:  /68   Pulse 63   Ht 6' 3" (1.905 m)   Wt 100.5 kg (221 lb 9 oz)   SpO2 96%   BMI 27.69 kg/m²     Physical Exam:  Physical Exam  Constitutional:       General: He is not in acute distress.     Appearance: Normal appearance. He is not ill-appearing " or diaphoretic.      Comments: Well-appearing man in NAD.   HENT:      Head: Normocephalic and atraumatic.      Nose: Nose normal.      Mouth/Throat:      Mouth: Mucous membranes are moist.      Pharynx: Oropharynx is clear.   Eyes:      Pupils: Pupils are equal, round, and reactive to light.   Cardiovascular:      Rate and Rhythm: Normal rate and regular rhythm.      Pulses: Normal pulses.      Heart sounds: No murmur heard.    No friction rub. No gallop.      Comments: Left anterior chest wall incision site is in excellent repair, with device freely mobile within the pocket.   Pulmonary:      Effort: Pulmonary effort is normal. No respiratory distress.      Breath sounds: Normal breath sounds. No wheezing, rhonchi or rales.   Chest:      Chest wall: No tenderness.   Abdominal:      General: There is no distension.      Palpations: Abdomen is soft.      Tenderness: There is no abdominal tenderness.   Musculoskeletal:         General: No swelling or tenderness.      Cervical back: Normal range of motion.      Right lower leg: No edema.      Left lower leg: No edema.   Skin:     General: Skin is warm and dry.      Findings: No erythema, lesion or rash.   Neurological:      General: No focal deficit present.      Mental Status: He is alert and oriented to person, place, and time. Mental status is at baseline.      Motor: No weakness.      Gait: Gait normal.   Psychiatric:         Mood and Affect: Mood normal.         Behavior: Behavior normal.        Laboratory Data:  Lab Results   Component Value Date    WBC 5.70 12/27/2022    HGB 15.2 12/27/2022    HCT 46.7 12/27/2022    MCV 95 12/27/2022     12/27/2022     Lab Results   Component Value Date    GLU 99 12/27/2022     12/27/2022    K 4.4 12/27/2022     12/27/2022    CO2 26 12/27/2022    BUN 9 12/27/2022    CREATININE 0.9 12/27/2022    CALCIUM 9.2 12/27/2022     No results found for: INR, PROTIME    Pertinent Cardiac Data:  ECG: Sequential AV paced  rhythm with rate of 63 bpm, AV delay 298 ms, paced  ms, QT/QTc 468/478 ms.     Resting 2D Transthoracic Echocardiogram - 9/9/2022:  The left ventricle is normal in size with  The visually estimated ejection fraction is 58% ( mid to upper 50s but no evidence of HCM).  The quantitatively derived ejection fraction is 61%.  Normal left ventricular diastolic function.  There are segmental left ventricular wall motion abnormalities.  Normal right ventricular size with normal right ventricular systolic function.  The ascending aorta is mildly dilated.    Device Interrogation: Personal review of his device interrogation ([place] MRI XT DR, implanted 9/17/2018) reveals adequate battery longevity with an estimated 3.1 years of battery life remaining. His leads were interrogated and revealed acceptable and stable lead parameters. He is presently in sinus rhythm with underlying complete heart block with no escape beats noted at 30 bpm. He is RA paced 68.8%, RV paced 99.9%. His prior episodes of VT occurred on his prior generator. Since implantation of this generator, he has not had any VT/VF recorded over the device lifetime. There are no concerning AHR or VHR episodes noted.         ASSESSMENT & PLAN:   History of Present Illness:  Mr. Clinton Weller is a daksha 62-year-old gentleman who presents today to establish care in the [facility] EP clinic for ongoing evaluation and recommendations regarding a history of mid-apical hypertrophic cardiomyopathy with prior VT, s/p implantation of a secondary prevention ICD. He has a past medical history significant for hypertrophic cardiomyopathy with two previous alcohol septal ablations in 2005 and 2006 at [facility], complicated by postoperative complete heart block, a history of VT s/p implantation of a secondary prevention Medtronic dual-chamber ICD 3/6/2006 with generator change on 9/17/2018, a history of AVNRT s/p slow pathway modification in 2014, a history of MMVT  with ineffective shocks, s/p successful RFA ablation of the LV septum 7/12/2016 at [place] with postablative DFTs effective at 20J, chronic underlying LBBB, COOPER maintained on CPAP therapy, anxiety, and overweight BMI.     - He has a normally functioning dual-chamber ICD on device interrogation today with underlying sinus bradycardia with complete heart block. He has a paced QRSd currently of 184ms. His recent echocardiogram demonstrated an LVEF of 58%. He has approximately 3.1 years remaining prior to requiring a generator change. We may consider implantation of a coronary sinus lead at his generator change.  - He has not had any episodes of VT/VF on this current generator since implantation in 2018. His prior VT episodes occurred in 2016, with no evidence of recurrence since his successful VT ablation. He remains off of antiarrhythmic therapy.   - He will continue to have Holter monitor assessment as per the Advanced Heart Failure service, with no evidence of any atrial or ventricular arrhythmias on device interrogation today.  - We will transfer his remote monitoring into our system. He will continue to send remote transmission q3 months, with his next in-office interrogation in six months.   - He will continue to follow closely with his PCP, general cardiologist, and his Advanced Heart Failure team for continued management of his chronic medical conditions.       This patient will return to clinic with me in six months with ongoing remote device transmissions in the interim. All questions and concerns were addressed at this encounter.     Signing Physician:       WALKER Kwok MD  Electrophysiology Attending

## 2023-01-30 NOTE — TELEPHONE ENCOUNTER
Called [facility] to have patient released in [facility] due to patient relocation, expecting call back. Patient already requested to our clinic in [facility].

## 2023-02-04 PROBLEM — G47.33 OSA (OBSTRUCTIVE SLEEP APNEA): Status: ACTIVE | Noted: 2023-02-04

## 2023-02-04 PROBLEM — Z98.890 HISTORY OF CARDIAC RADIOFREQUENCY ABLATION: Status: ACTIVE | Noted: 2023-02-04
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
## 2023-02-06 ENCOUNTER — TELEPHONE (OUTPATIENT)
Dept: AUDIOLOGY | Facility: CLINIC | Age: 63
End: 2023-02-06
Payer: COMMERCIAL

## 2023-02-06 ENCOUNTER — OFFICE VISIT (OUTPATIENT)
Dept: OTOLARYNGOLOGY | Facility: CLINIC | Age: 63
End: 2023-02-06
Payer: COMMERCIAL

## 2023-02-06 VITALS
HEART RATE: 87 BPM | BODY MASS INDEX: 27.53 KG/M2 | WEIGHT: 220.25 LBS | SYSTOLIC BLOOD PRESSURE: 129 MMHG | TEMPERATURE: 98 F | DIASTOLIC BLOOD PRESSURE: 70 MMHG

## 2023-02-06 DIAGNOSIS — H81.399 PERIPHERAL POSITIONAL VERTIGO, UNSPECIFIED LATERALITY: ICD-10-CM

## 2023-02-06 DIAGNOSIS — Z86.16 PERSONAL HISTORY OF COVID-19: ICD-10-CM

## 2023-02-06 DIAGNOSIS — H90.3 SENSORINEURAL HEARING LOSS (SNHL) OF BOTH EARS: ICD-10-CM

## 2023-02-06 DIAGNOSIS — J34.2 NASAL SEPTAL DEVIATION: ICD-10-CM

## 2023-02-06 DIAGNOSIS — H93.13 TINNITUS OF BOTH EARS: ICD-10-CM

## 2023-02-06 DIAGNOSIS — R42 DYSEQUILIBRIUM: Primary | ICD-10-CM

## 2023-02-06 DIAGNOSIS — R27.0 ATAXIA: ICD-10-CM

## 2023-02-06 DIAGNOSIS — R42 DIZZINESS: ICD-10-CM

## 2023-02-06 DIAGNOSIS — G47.33 OBSTRUCTIVE SLEEP APNEA TREATED WITH CONTINUOUS POSITIVE AIRWAY PRESSURE (CPAP): ICD-10-CM

## 2023-02-06 PROCEDURE — 3078F PR MOST RECENT DIASTOLIC BLOOD PRESSURE < 80 MM HG: ICD-10-PCS | Mod: CPTII,S$GLB,, | Performed by: SPECIALIST

## 2023-02-06 PROCEDURE — 99999 PR PBB SHADOW E&M-EST. PATIENT-LVL III: ICD-10-PCS | Mod: PBBFAC,,, | Performed by: SPECIALIST

## 2023-02-06 PROCEDURE — 99214 PR OFFICE/OUTPT VISIT, EST, LEVL IV, 30-39 MIN: ICD-10-PCS | Mod: S$GLB,,, | Performed by: SPECIALIST

## 2023-02-06 PROCEDURE — 3008F BODY MASS INDEX DOCD: CPT | Mod: CPTII,S$GLB,, | Performed by: SPECIALIST

## 2023-02-06 PROCEDURE — 3074F PR MOST RECENT SYSTOLIC BLOOD PRESSURE < 130 MM HG: ICD-10-PCS | Mod: CPTII,S$GLB,, | Performed by: SPECIALIST

## 2023-02-06 PROCEDURE — 1159F PR MEDICATION LIST DOCUMENTED IN MEDICAL RECORD: ICD-10-PCS | Mod: CPTII,S$GLB,, | Performed by: SPECIALIST

## 2023-02-06 PROCEDURE — 3078F DIAST BP <80 MM HG: CPT | Mod: CPTII,S$GLB,, | Performed by: SPECIALIST

## 2023-02-06 PROCEDURE — 1160F RVW MEDS BY RX/DR IN RCRD: CPT | Mod: CPTII,S$GLB,, | Performed by: SPECIALIST

## 2023-02-06 PROCEDURE — 1160F PR REVIEW ALL MEDS BY PRESCRIBER/CLIN PHARMACIST DOCUMENTED: ICD-10-PCS | Mod: CPTII,S$GLB,, | Performed by: SPECIALIST

## 2023-02-06 PROCEDURE — 1159F MED LIST DOCD IN RCRD: CPT | Mod: CPTII,S$GLB,, | Performed by: SPECIALIST

## 2023-02-06 PROCEDURE — 3074F SYST BP LT 130 MM HG: CPT | Mod: CPTII,S$GLB,, | Performed by: SPECIALIST

## 2023-02-06 PROCEDURE — 99214 OFFICE O/P EST MOD 30 MIN: CPT | Mod: S$GLB,,, | Performed by: SPECIALIST

## 2023-02-06 PROCEDURE — 99999 PR PBB SHADOW E&M-EST. PATIENT-LVL III: CPT | Mod: PBBFAC,,, | Performed by: SPECIALIST

## 2023-02-06 PROCEDURE — 3008F PR BODY MASS INDEX (BMI) DOCUMENTED: ICD-10-PCS | Mod: CPTII,S$GLB,, | Performed by: SPECIALIST

## 2023-02-06 NOTE — TELEPHONE ENCOUNTER
----- Message from Tim Coronel MA sent at 2/6/2023  9:31 AM CST -----  Regarding: VNG  Good morning Dr. Mancini wants a VNG on patient, he can be reached at [phone]. Thanks.

## 2023-02-08 ENCOUNTER — TELEPHONE (OUTPATIENT)
Dept: CARDIOLOGY | Facility: HOSPITAL | Age: 63
End: 2023-02-08
Payer: COMMERCIAL

## 2023-02-10 ENCOUNTER — HOSPITAL ENCOUNTER (OUTPATIENT)
Dept: CARDIOLOGY | Facility: HOSPITAL | Age: 63
Discharge: HOME | End: 2023-02-10
Attending: INTERNAL MEDICINE
Payer: COMMERCIAL

## 2023-02-10 VITALS
HEART RATE: 75 BPM | WEIGHT: 220 LBS | HEIGHT: 75 IN | SYSTOLIC BLOOD PRESSURE: 109 MMHG | RESPIRATION RATE: 16 BRPM | DIASTOLIC BLOOD PRESSURE: 59 MMHG | BODY MASS INDEX: 27.35 KG/M2

## 2023-02-10 DIAGNOSIS — I42.2 HYPERTROPHIC CARDIOMYOPATHY: ICD-10-CM

## 2023-02-10 LAB
CFR FLOW - ANTERIOR: 2.02
CFR FLOW - INFERIOR: 1.94
CFR FLOW - LATERAL: 2.23
CFR FLOW - MAX: 3.1
CFR FLOW - MIN: 1.13
CFR FLOW - SEPTAL: 2.16
CFR FLOW - WHOLE HEART: 2.09
CV STRESS BASE HR: 79 BPM
DIASTOLIC BLOOD PRESSURE: 74 MMHG
EJECTION FRACTION- HIGH: 59 %
END DIASTOLIC INDEX-HIGH: 155 ML/M2
END DIASTOLIC INDEX-LOW: 91 ML/M2
END SYSTOLIC INDEX-HIGH: 78 ML/M2
END SYSTOLIC INDEX-LOW: 40 ML/M2
NUC REST DIASTOLIC VOLUME INDEX: 107
NUC REST EJECTION FRACTION: 64
NUC REST SYSTOLIC VOLUME INDEX: 38
NUC STRESS DIASTOLIC VOLUME INDEX: 140
NUC STRESS EJECTION FRACTION: 58 %
NUC STRESS SYSTOLIC VOLUME INDEX: 59
OHS CV CPX 1 MINUTE RECOVERY HEART RATE: 63 BPM
OHS CV CPX 85 PERCENT MAX PREDICTED HEART RATE MALE: 136
OHS CV CPX MAX PREDICTED HEART RATE: 160
OHS CV CPX PATIENT IS FEMALE: 0
OHS CV CPX PATIENT IS MALE: 1
OHS CV CPX PEAK DIASTOLIC BLOOD PRESSURE: 62 MMHG
OHS CV CPX PEAK HEAR RATE: 76 BPM
OHS CV CPX PEAK RATE PRESSURE PRODUCT: 7828
OHS CV CPX PEAK SYSTOLIC BLOOD PRESSURE: 103 MMHG
OHS CV CPX PERCENT MAX PREDICTED HEART RATE ACHIEVED: 48
OHS CV CPX RATE PRESSURE PRODUCT PRESENTING: 8848
PERFUSION DEFECT 1 SIZE IN %: 13 %
REST FLOW - ANTERIOR: 0.48 CC/MIN/G
REST FLOW - INFERIOR: 0.64 CC/MIN/G
REST FLOW - LATERAL: 0.67 CC/MIN/G
REST FLOW - MAX: 0.87 CC/MIN/G
REST FLOW - MIN: 0.21 CC/MIN/G
REST FLOW - SEPTAL: 0.36 CC/MIN/G
REST FLOW - WHOLE HEART: 0.54 CC/MIN/G
RETIRED EF AND QEF - SEE NOTES: 47 %
STRESS FLOW - ANTERIOR: 0.96 CC/MIN/G
STRESS FLOW - INFERIOR: 1.22 CC/MIN/G
STRESS FLOW - LATERAL: 1.5 CC/MIN/G
STRESS FLOW - MAX: 1.99 CC/MIN/G
STRESS FLOW - MIN: 0.37 CC/MIN/G
STRESS FLOW - SEPTAL: 0.78 CC/MIN/G
STRESS FLOW - WHOLE HEART: 1.12 CC/MIN/G
SYSTOLIC BLOOD PRESSURE: 112 MMHG

## 2023-02-10 PROCEDURE — 63600175 PHARM REV CODE 636 W HCPCS: Performed by: INTERNAL MEDICINE

## 2023-02-10 PROCEDURE — 78431 MYOCRD IMG PET RST&STRS CT: CPT | Mod: 26,,, | Performed by: INTERNAL MEDICINE

## 2023-02-10 PROCEDURE — 93016 CV STRESS TEST SUPVJ ONLY: CPT | Mod: ,,, | Performed by: INTERNAL MEDICINE

## 2023-02-10 PROCEDURE — 78434 CARDIAC PET SCAN STRESS (CUPID ONLY): ICD-10-PCS | Mod: 26,,, | Performed by: INTERNAL MEDICINE

## 2023-02-10 PROCEDURE — 93018 CV STRESS TEST I&R ONLY: CPT | Mod: ,,, | Performed by: INTERNAL MEDICINE

## 2023-02-10 PROCEDURE — 78434 AQMBF PET REST & RX STRESS: CPT | Mod: 26,,, | Performed by: INTERNAL MEDICINE

## 2023-02-10 PROCEDURE — 78434 AQMBF PET REST & RX STRESS: CPT

## 2023-02-10 PROCEDURE — 93018 CARDIAC PET SCAN STRESS (CUPID ONLY): ICD-10-PCS | Mod: ,,, | Performed by: INTERNAL MEDICINE

## 2023-02-10 PROCEDURE — 78431 CARDIAC PET SCAN STRESS (CUPID ONLY): ICD-10-PCS | Mod: 26,,, | Performed by: INTERNAL MEDICINE

## 2023-02-10 PROCEDURE — 93016 CARDIAC PET SCAN STRESS (CUPID ONLY): ICD-10-PCS | Mod: ,,, | Performed by: INTERNAL MEDICINE

## 2023-02-10 PROCEDURE — 78431 MYOCRD IMG PET RST&STRS CT: CPT

## 2023-02-10 RX ORDER — REGADENOSON 0.08 MG/ML
0.4 INJECTION, SOLUTION INTRAVENOUS ONCE
Status: COMPLETED | OUTPATIENT
Start: 2023-02-10 | End: 2023-02-10

## 2023-02-10 RX ORDER — CAFFEINE CITRATE 20 MG/ML
60 SOLUTION INTRAVENOUS ONCE
Status: COMPLETED | OUTPATIENT
Start: 2023-02-10 | End: 2023-02-10

## 2023-02-10 RX ADMIN — REGADENOSON 0.4 MG: 0.08 INJECTION, SOLUTION INTRAVENOUS at 10:02

## 2023-02-10 RX ADMIN — CAFFEINE CITRATE 60 MG: 20 INJECTION INTRAVENOUS at 11:02

## 2023-02-14 ENCOUNTER — CLINICAL SUPPORT (OUTPATIENT)
Dept: AUDIOLOGY | Facility: CLINIC | Age: 63
End: 2023-02-14
Payer: COMMERCIAL

## 2023-02-14 DIAGNOSIS — H81.8X9 OTHER DISORDERS OF VESTIBULAR FUNCTION, UNSPECIFIED EAR: Primary | ICD-10-CM

## 2023-02-14 PROCEDURE — 92537 PR CALORIC VSTBLR TEST W/REC BITHERMAL: ICD-10-PCS | Mod: S$GLB,,,

## 2023-02-14 PROCEDURE — 92540 PR VESTIBULAR EVAL NYSTAG FOVL&PERPH STIM OSCIL TRACKING: ICD-10-PCS | Mod: S$GLB,,,

## 2023-02-14 PROCEDURE — 92540 BASIC VESTIBULAR EVALUATION: CPT | Mod: S$GLB,,,

## 2023-02-14 PROCEDURE — 92537 CALORIC VSTBLR TEST W/REC: CPT | Mod: S$GLB,,,

## 2023-02-14 NOTE — Clinical Note
Ciro Mancini,  Please review Mr. Weller's VNG results from today. If you have any questions, please let me know!  Thanks, Tracy
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
## 2023-02-15 DIAGNOSIS — R26.89 IMBALANCE: Primary | ICD-10-CM

## 2023-02-15 DIAGNOSIS — R42 DISEQUILIBRIUM: ICD-10-CM

## 2023-02-16 NOTE — PROGRESS NOTES
"Subjective:       Patient ID: Clinton Weller is a 62 y.o. male.    Chief Complaint: Hypertension    Hypertension  Pertinent negatives include no chest pain, palpitations or shortness of breath.   Pt is here for follow up of htn heart disease cardiomyopathy followed in [place] he is on lasix however pt feels light headed at times  He admits he does not drink enough water no acute event bp low when he takes it pt c/o cough thinks it is pnd as it is cyclical   Review of Systems   Constitutional:  Negative for chills, fatigue and fever.   Respiratory:  Positive for cough. Negative for chest tightness and shortness of breath.    Cardiovascular:  Negative for chest pain and palpitations.   Gastrointestinal:  Negative for abdominal distention, abdominal pain and blood in stool.     Objective:        Physical Exam  Constitutional:       Appearance: Normal appearance. He is not ill-appearing.   HENT:      Nose: Rhinorrhea present. No congestion.   Pulmonary:      Effort: Pulmonary effort is normal. No respiratory distress.      Breath sounds: No rales.   Neurological:      General: No focal deficit present.      Mental Status: He is alert and oriented to person, place, and time.      Cranial Nerves: No cranial nerve deficit.      Coordination: Coordination normal.     Bun/creat 10/1 in 9/2022  Assessment:       1. Essential hypertension    2. Chronic cough    3. Dizziness          Plan:     Orders declined labs cxr  Lasix prn cut in half if okay with cards     Otc for pnd  Rtc bp check     "This note will not be shared with the patient."   "

## 2023-02-20 ENCOUNTER — TELEPHONE (OUTPATIENT)
Dept: OTOLARYNGOLOGY | Facility: CLINIC | Age: 63
End: 2023-02-20
Payer: COMMERCIAL

## 2023-02-20 NOTE — TELEPHONE ENCOUNTER
----- Message from Tim Coronel MA sent at 2/17/2023  5:53 PM CST -----    ----- Message -----  From: CHANTEL Mancini MD  Sent: 2/15/2023   1:52 PM CST  To: Tim Coronel MA    Patient's balance testing was normal.  Please try to get him to see me within a week.  I have put in a consult for vestibular rehabilitative therapy which I will want him to complete.  The rehab people may be calling him before he sees me so please tell him what I have done.  ----- Message -----  From: TRI Horn  Sent: 2/14/2023   3:03 PM CST  To: CHANTEL Mancini MD    Hi Dr. Mancini,    Please review  Weller's VNG results from today. If you have any questions, please let me know!    Thanks,  Tracy    Called and spoke with patient today an schedule him for 03/13/23 at 1:20 pm with Dr. Mancini.

## 2023-03-06 ENCOUNTER — OFFICE VISIT (OUTPATIENT)
Dept: NEUROLOGY | Facility: CLINIC | Age: 63
End: 2023-03-06
Payer: COMMERCIAL

## 2023-03-06 VITALS
WEIGHT: 210.44 LBS | BODY MASS INDEX: 26.16 KG/M2 | SYSTOLIC BLOOD PRESSURE: 110 MMHG | HEART RATE: 73 BPM | DIASTOLIC BLOOD PRESSURE: 69 MMHG | HEIGHT: 75 IN

## 2023-03-06 DIAGNOSIS — G47.33 OSA (OBSTRUCTIVE SLEEP APNEA): ICD-10-CM

## 2023-03-06 PROCEDURE — 3078F DIAST BP <80 MM HG: CPT | Mod: CPTII,S$GLB,, | Performed by: PHYSICIAN ASSISTANT

## 2023-03-06 PROCEDURE — 99214 OFFICE O/P EST MOD 30 MIN: CPT | Mod: S$GLB,,, | Performed by: PHYSICIAN ASSISTANT

## 2023-03-06 PROCEDURE — 1160F PR REVIEW ALL MEDS BY PRESCRIBER/CLIN PHARMACIST DOCUMENTED: ICD-10-PCS | Mod: CPTII,S$GLB,, | Performed by: PHYSICIAN ASSISTANT

## 2023-03-06 PROCEDURE — 3074F SYST BP LT 130 MM HG: CPT | Mod: CPTII,S$GLB,, | Performed by: PHYSICIAN ASSISTANT

## 2023-03-06 PROCEDURE — 1160F RVW MEDS BY RX/DR IN RCRD: CPT | Mod: CPTII,S$GLB,, | Performed by: PHYSICIAN ASSISTANT

## 2023-03-06 PROCEDURE — 3078F PR MOST RECENT DIASTOLIC BLOOD PRESSURE < 80 MM HG: ICD-10-PCS | Mod: CPTII,S$GLB,, | Performed by: PHYSICIAN ASSISTANT

## 2023-03-06 PROCEDURE — 3074F PR MOST RECENT SYSTOLIC BLOOD PRESSURE < 130 MM HG: ICD-10-PCS | Mod: CPTII,S$GLB,, | Performed by: PHYSICIAN ASSISTANT

## 2023-03-06 PROCEDURE — 99999 PR PBB SHADOW E&M-EST. PATIENT-LVL III: ICD-10-PCS | Mod: PBBFAC,,, | Performed by: PHYSICIAN ASSISTANT

## 2023-03-06 PROCEDURE — 99999 PR PBB SHADOW E&M-EST. PATIENT-LVL III: CPT | Mod: PBBFAC,,, | Performed by: PHYSICIAN ASSISTANT

## 2023-03-06 PROCEDURE — 1159F MED LIST DOCD IN RCRD: CPT | Mod: CPTII,S$GLB,, | Performed by: PHYSICIAN ASSISTANT

## 2023-03-06 PROCEDURE — 1159F PR MEDICATION LIST DOCUMENTED IN MEDICAL RECORD: ICD-10-PCS | Mod: CPTII,S$GLB,, | Performed by: PHYSICIAN ASSISTANT

## 2023-03-06 PROCEDURE — 99214 PR OFFICE/OUTPT VISIT, EST, LEVL IV, 30-39 MIN: ICD-10-PCS | Mod: S$GLB,,, | Performed by: PHYSICIAN ASSISTANT

## 2023-03-06 PROCEDURE — 3008F PR BODY MASS INDEX (BMI) DOCUMENTED: ICD-10-PCS | Mod: CPTII,S$GLB,, | Performed by: PHYSICIAN ASSISTANT

## 2023-03-06 PROCEDURE — 3008F BODY MASS INDEX DOCD: CPT | Mod: CPTII,S$GLB,, | Performed by: PHYSICIAN ASSISTANT

## 2023-03-06 NOTE — PROGRESS NOTES
[facility] - NEUROLOGY [address]TH [place] [facility], [place]    Date: 3/6/23  Patient Name: Clinton Weller   MRN: [id number]   PCP: Brittney Weaver  Referring Provider: No ref. provider found    Chief Complaint: Dizziness  Subjective:     Interval History 03/06/2023    I have reviewed all relevant history in Epic. The patient presents for routine follow up regarding dizziness. Since his last visit the patient has been evaluated by cardiology and ENT. He has completed vestibular therapy outpatient as well. He notes that his symptoms have mostly dissipated following the physical therapy. He notes he will occasionally experience symptoms if he changes positions very quickly but he attributes this to his heart and notes that this is fairly baseline. He states that his tinnitus is still present but was told by ENT that this is unfortunately due to progressive hearing loss and cannot be resolved at this time even with the aid of hearing aids. He notes that his shortness of breath is stable and finds that the majority of his symptoms are related to his heart and when he takes his medication as scheduled his symptoms seem to resolve.       HPI:   Mr. Clinton Weller is a 62 y.o. male with hypertrophic cardiomyopathy and MTHFR mutation presenting for evaluation of dizziness. On chart review he was seen in clinic today by his PCP for this problem and then referred for further workup and evaluation. Of note they were also sent to cardiology by their PCP for further workup and evaluation. He states the dizziness has occurred previously but it's been several years since it has occurred. He notes previously his dizziness was found to be due to low vitamin B12 for which he received injections and resolved his symptoms.     He notes when he has symptoms it is when he is standing he feels like he is pre-syncopal he notes he has had syncopal episodes previously due to tachycardia. He notes that he has not  had an episode of LOC since that occurred. He also states that he has some numbness and tingling in his hands and feet. He notes that this typically occurs when he is changing position such as going from sitting to standing and he has noticed that squats and things also can trigger his symptoms. He denies any falls. He states that the symptoms last less than one minute and he tends to experience symptoms every 2-3 days. He denies any weakness, loss of vision, or LOC. He does have some SOB when the symptoms occur. He denies any associated N/V and also denies any weakness.     Family History- Father with Brain Cancer    PAST MEDICAL HISTORY:  Past Medical History:   Diagnosis Date    AICD (automatic cardioverter/defibrillator) present     Hypertrophic cardiomyopathy     Pacemaker     Psoriasis        PAST SURGICAL HISTORY:  Past Surgical History:   Procedure Laterality Date    COLONOSCOPY N/A 2022    Procedure: COLONOSCOPY;  Surgeon: Fran Zaragoza MD;  Location: [address]);  Service: Endoscopy;  Laterality: N/A;  Fully vaccinated/ inst emailed-RB    precall [employer] PAC[employer] DEPENDENT- [facility]       CURRENT MEDS:  Current Outpatient Medications   Medication Sig Dispense Refill    clobetasoL (TEMOVATE) 0.05 % cream SMARTSI Topical Daily PRN      furosemide (LASIX) 20 MG tablet Take 1 tablet (20 mg total) by mouth daily as needed. 30 tablet 6    OTEZLA 30 mg Tab TAKE 1 TABLET BY MOUTH 2 TIMES A DAY. 60 tablet 3    levocetirizine (XYZAL) 5 MG tablet Take 1 tablet (5 mg total) by mouth every evening. 30 tablet 3    LORazepam (ATIVAN) 0.5 MG tablet Take 1 tablet (0.5 mg total) by mouth every 8 (eight) hours as needed. 20 tablet 1     No current facility-administered medications for this visit.       ALLERGIES:  Review of patient's allergies indicates:   Allergen Reactions    Penicillins Dermatitis, Rash, Shortness Of Breath, Hives and Other (See Comments)    Sulfa (sulfonamide antibiotics)  "Shortness Of Breath, Other (See Comments), Rash and Hives     Other reaction(s): Rash, Unspecified  Other reaction(s): Rash       FAMILY HISTORY:  History reviewed. No pertinent family history.    SOCIAL HISTORY:  Social History     Tobacco Use    Smoking status: Former    Smokeless tobacco: Never   Substance Use Topics    Alcohol use: Yes     Alcohol/week: 5.0 standard drinks     Types: 5 Glasses of wine per week    Drug use: Never       Review of Systems:  Review of Systems   Constitutional:  Negative for chills, fever and weight loss.   HENT:  Positive for tinnitus. Negative for ear discharge, ear pain, hearing loss, sinus pain and sore throat.    Eyes:  Negative for blurred vision, double vision and photophobia.   Respiratory:  Positive for shortness of breath. Negative for cough and wheezing.    Cardiovascular:  Negative for chest pain, palpitations and orthopnea.   Gastrointestinal:  Negative for constipation, diarrhea, nausea and vomiting.   Genitourinary:  Negative for dysuria, frequency and urgency.   Musculoskeletal:  Negative for back pain, myalgias and neck pain.   Neurological:  Negative for dizziness, tingling, focal weakness, seizures, loss of consciousness, weakness and headaches.          Objective:     Vitals:    03/06/23 1007   BP: 110/69   Pulse: 73   Weight: 95.5 kg (210 lb 6.9 oz)   Height: 6' 3" (1.905 m)         Lab Results   Component Value Date    WBC 5.70 12/27/2022    HGB 15.2 12/27/2022    HCT 46.7 12/27/2022     12/27/2022    CHOL 184 09/09/2022    TRIG 79 09/09/2022    HDL 54 09/09/2022    ALT 31 12/27/2022    AST 29 12/27/2022     12/27/2022    K 4.4 12/27/2022     12/27/2022    CREATININE 0.9 12/27/2022    BUN 9 12/27/2022    CO2 26 12/27/2022    TSH 2.640 12/27/2022    MMOMZYWZ20 561 12/27/2022    VITAMINB6 10 12/27/2022       NEUROLOGICAL EXAMINATION:     MENTAL STATUS   Oriented to person, place, and time.   Level of consciousness: alert    CRANIAL NERVES     CN " III, IV, VI   Pupils are equal, round, and reactive to light.  Extraocular motions are normal.     CN V   Facial sensation intact.     CN VII   Facial expression full, symmetric.     CN VIII   CN VIII normal.     CN IX, X   CN IX normal.   CN X normal.     CN XI   CN XI normal.        L sided positive Epley maneuver      MOTOR EXAM   Muscle bulk: normal    Strength   Strength 5/5 throughout.     REFLEXES     Reflexes   Right brachioradialis: 2+  Left brachioradialis: 2+  Right biceps: 2+  Left biceps: 2+  Right triceps: 2+  Left triceps: 2+  Right patellar: 2+  Left patellar: 2+  Right achilles: 2+  Left achilles: 2+  ? ?        Assessment:   Clinton Weller is a 62 y.o. male presenting for evaluation regarding dizziness which occurs when changing position.    Plan:   I discussed with the patient in depth the risk/benefits of the following plan and the patient was amenable. We discussed the following:     -Patients symptoms have resolved with the assistance of physical therapy and notes that he is doing well       RTC PRN       Problem List Items Addressed This Visit          Other    COOPER (obstructive sleep apnea)    Current Assessment & Plan     Using CPAP regularly and is doing well                  I spent a total of 30 minutes on the day of the visit. This includes face to face time and non-face to face time preparing to see the patient (eg, review of tests), obtaining and/or reviewing separately obtained history, documenting clinical information in the electronic or other health record, independently interpreting results and communicating results to the patient/family/caregiver, or care coordinator.        Deedee Wilkes PA-C  Supervising physician Mateo Pendleton MD   [facility]

## 2023-03-11 NOTE — PROGRESS NOTES
Subjective:       Patient ID: Clinton Weller is a 62 y.o. male.    Chief Complaint: Follow-up    The patient is returning for the results of his audio vestibular evaluation.  There are multiple issues to discuss:  1. Chronic imbalance:  The patient is not having vertigo.  He was having a sensation of rocking back and forth like he was on a boat or riding in a train.  His imbalance and disequilibrium have resolved since he went through a course of vestibular rehabilitative therapy.  He continues to do his home rehab exercises.  He is able to ride his bike and has no significant balance issues currently.  2. Allergic rhinitis:  Nasal secretions are clear.  He does have symptoms that are controlled by taking levo cetirizine.        Review of Systems     Constitutional: Negative for appetite change, chills, fatigue, fever and unexpected weight loss.      HENT: Positive for hearing loss, postnasal drip, runny nose, sinus pressure, sore throat and stuffy nose.  Negative for ear discharge, ear infection, ear pain, facial swelling, mouth sores, nosebleeds, ringing in the ears, sinus infection, tonsil infection, dental problems, trouble swallowing and voice change.      Eyes:  Negative for change in eyesight, eye drainage, eye itching and photophobia.     Respiratory:  Positive for cough, shortness of breath and sleep apnea. Negative for snoring and wheezing.      Cardiovascular:  Positive for chest pain, foot swelling and irregular heartbeat. Negative for swollen veins.     Gastrointestinal:  Negative for abdominal pain, acid reflux, constipation, diarrhea, heartburn and vomiting.     Genitourinary: Negative for difficulty urinating, sexual problems and frequent urination.     Musc: Positive for aching muscles and back pain. Negative for aching joints and neck pain.     Skin: Positive for rash.     Allergy: Negative for food allergies and seasonal allergies.     Endocrine: Negative for cold intolerance and heat  intolerance.      Neurological: Positive for dizziness, headaches and light-headedness. Negative for seizures and tremors.  Concussion x2    Hematologic: Negative for bruises/bleeds easily and swollen glands.      Psychiatric: Negative for decreased concentration, depression, nervous/anxious and sleep disturbance.              Objective:      Physical Exam  Vitals and nursing note reviewed.   Constitutional:       General: He is awake.      Appearance: Normal appearance. He is well-developed, well-groomed and normal weight.   HENT:      Head: Normocephalic.      Jaw: There is normal jaw occlusion.      Salivary Glands: Right salivary gland is not diffusely enlarged or tender. Left salivary gland is not diffusely enlarged or tender.      Right Ear: Hearing, ear canal and external ear normal. Tympanic membrane is retracted.      Left Ear: Hearing, ear canal and external ear normal. Tympanic membrane is retracted.      Nose: Septal deviation (To the left), mucosal edema (cyanotic, boggy inferior turbinates bilaterally) and rhinorrhea (clear mucus bilaterally) present. No nasal deformity. Rhinorrhea is clear.      Right Turbinates: Enlarged and pale.      Left Turbinates: Enlarged and pale.      Mouth/Throat:      Lips: No lesions.      Mouth: Mucous membranes are moist. No oral lesions.      Dentition: No gum lesions.      Tongue: No lesions.      Palate: No mass and lesions.      Pharynx: Oropharynx is clear. Uvula midline.   Eyes:      General: Lids are normal.         Right eye: No discharge.         Left eye: No discharge.      Conjunctiva/sclera:      Right eye: Right conjunctiva is injected. No exudate.     Left eye: Left conjunctiva is injected. No exudate.     Pupils: Pupils are equal, round, and reactive to light.   Neck:      Thyroid: No thyroid mass or thyromegaly.      Vascular: No carotid bruit.      Trachea: Trachea normal. No tracheal deviation.   Cardiovascular:      Rate and Rhythm: Normal rate and  regular rhythm.      Pulses: Normal pulses.      Heart sounds: Normal heart sounds.   Pulmonary:      Effort: Pulmonary effort is normal.      Breath sounds: Normal breath sounds. No stridor. No decreased breath sounds, wheezing, rhonchi or rales.   Abdominal:      General: Bowel sounds are normal.      Palpations: Abdomen is soft.      Tenderness: There is no abdominal tenderness.   Musculoskeletal:         General: Normal range of motion.      Cervical back: Normal range of motion. No muscular tenderness.   Lymphadenopathy:      Head:      Right side of head: No submental, submandibular, preauricular, posterior auricular or occipital adenopathy.      Left side of head: No submental, submandibular, preauricular, posterior auricular or occipital adenopathy.      Cervical: No cervical adenopathy.   Skin:     General: Skin is warm and dry.      Findings: No petechiae or rash.      Nails: There is no clubbing.   Neurological:      Mental Status: He is alert and oriented to person, place, and time.      Cranial Nerves: No cranial nerve deficit.      Sensory: No sensory deficit.      Gait: Gait normal.      Comments: Neuro otologic-no nystagmus,  Romberg rocks left to right, tandem Romberg negative (improved from examination at last visit)   Psychiatric:         Speech: Speech normal.         Behavior: Behavior normal. Behavior is cooperative.         Thought Content: Thought content normal.         Judgment: Judgment normal.       VNG performed 02/14/2023: Normal    I personally reviewed the above audiogram and VNG with the patient and provided him with a copy.  Pertinent findings:  Normal VNG and mild-to-moderate high-frequency hearing loss with normal hearing and speech frequencies and normal discrimination.    Assessment:       1. Imbalance    2. Disequilibrium    3. Sensorineural hearing loss (SNHL) of both ears    4. Tinnitus of both ears    5. Nasal septal deviation          Plan:       I  will have the patient  continue with his home vestibular rehab exercises.  Regarding his hearing he needs to wear hearing protection when in loud noise environments.  I have advised that he use over the ear ear Muff type headphones rather than ear pods when listening to music.  He should undergo repeat audiologic evaluation in 2 years.  I will recheck him in 3 months, or sooner on an as-needed basis.

## 2023-03-13 ENCOUNTER — OFFICE VISIT (OUTPATIENT)
Dept: OTOLARYNGOLOGY | Facility: CLINIC | Age: 63
End: 2023-03-13
Payer: COMMERCIAL

## 2023-03-13 VITALS
WEIGHT: 214.81 LBS | BODY MASS INDEX: 26.85 KG/M2 | SYSTOLIC BLOOD PRESSURE: 115 MMHG | TEMPERATURE: 97 F | DIASTOLIC BLOOD PRESSURE: 69 MMHG | HEART RATE: 61 BPM

## 2023-03-13 DIAGNOSIS — R26.89 IMBALANCE: Primary | ICD-10-CM

## 2023-03-13 DIAGNOSIS — R42 DISEQUILIBRIUM: ICD-10-CM

## 2023-03-13 DIAGNOSIS — H93.13 TINNITUS OF BOTH EARS: ICD-10-CM

## 2023-03-13 DIAGNOSIS — H90.3 SENSORINEURAL HEARING LOSS (SNHL) OF BOTH EARS: ICD-10-CM

## 2023-03-13 DIAGNOSIS — J34.2 NASAL SEPTAL DEVIATION: ICD-10-CM

## 2023-03-13 PROCEDURE — 3078F DIAST BP <80 MM HG: CPT | Mod: CPTII,S$GLB,, | Performed by: SPECIALIST

## 2023-03-13 PROCEDURE — 3074F SYST BP LT 130 MM HG: CPT | Mod: CPTII,S$GLB,, | Performed by: SPECIALIST

## 2023-03-13 PROCEDURE — 1159F MED LIST DOCD IN RCRD: CPT | Mod: CPTII,S$GLB,, | Performed by: SPECIALIST

## 2023-03-13 PROCEDURE — 1160F RVW MEDS BY RX/DR IN RCRD: CPT | Mod: CPTII,S$GLB,, | Performed by: SPECIALIST

## 2023-03-13 PROCEDURE — 3074F PR MOST RECENT SYSTOLIC BLOOD PRESSURE < 130 MM HG: ICD-10-PCS | Mod: CPTII,S$GLB,, | Performed by: SPECIALIST

## 2023-03-13 PROCEDURE — 3078F PR MOST RECENT DIASTOLIC BLOOD PRESSURE < 80 MM HG: ICD-10-PCS | Mod: CPTII,S$GLB,, | Performed by: SPECIALIST

## 2023-03-13 PROCEDURE — 3008F PR BODY MASS INDEX (BMI) DOCUMENTED: ICD-10-PCS | Mod: CPTII,S$GLB,, | Performed by: SPECIALIST

## 2023-03-13 PROCEDURE — 3008F BODY MASS INDEX DOCD: CPT | Mod: CPTII,S$GLB,, | Performed by: SPECIALIST

## 2023-03-13 PROCEDURE — 99999 PR PBB SHADOW E&M-EST. PATIENT-LVL III: CPT | Mod: PBBFAC,,, | Performed by: SPECIALIST

## 2023-03-13 PROCEDURE — 99214 PR OFFICE/OUTPT VISIT, EST, LEVL IV, 30-39 MIN: ICD-10-PCS | Mod: S$GLB,,, | Performed by: SPECIALIST

## 2023-03-13 PROCEDURE — 99999 PR PBB SHADOW E&M-EST. PATIENT-LVL III: ICD-10-PCS | Mod: PBBFAC,,, | Performed by: SPECIALIST

## 2023-03-13 PROCEDURE — 99214 OFFICE O/P EST MOD 30 MIN: CPT | Mod: S$GLB,,, | Performed by: SPECIALIST

## 2023-03-13 PROCEDURE — 1160F PR REVIEW ALL MEDS BY PRESCRIBER/CLIN PHARMACIST DOCUMENTED: ICD-10-PCS | Mod: CPTII,S$GLB,, | Performed by: SPECIALIST

## 2023-03-13 PROCEDURE — 1159F PR MEDICATION LIST DOCUMENTED IN MEDICAL RECORD: ICD-10-PCS | Mod: CPTII,S$GLB,, | Performed by: SPECIALIST

## 2023-04-11 ENCOUNTER — TELEPHONE (OUTPATIENT)
Dept: FAMILY MEDICINE | Facility: CLINIC | Age: 63
End: 2023-04-11
Payer: COMMERCIAL

## 2023-04-11 ENCOUNTER — OFFICE VISIT (OUTPATIENT)
Dept: DERMATOLOGY | Facility: CLINIC | Age: 63
End: 2023-04-11
Payer: COMMERCIAL

## 2023-04-11 ENCOUNTER — LAB VISIT (OUTPATIENT)
Dept: LAB | Facility: HOSPITAL | Age: 63
End: 2023-04-11
Attending: DERMATOLOGY
Payer: COMMERCIAL

## 2023-04-11 DIAGNOSIS — I42.1 HYPERTROPHIC OBSTRUCTIVE CARDIOMYOPATHY (HOCM): ICD-10-CM

## 2023-04-11 DIAGNOSIS — B00.9 HERPES SIMPLEX INFECTION OF SKIN: ICD-10-CM

## 2023-04-11 DIAGNOSIS — L40.0 PSORIASIS VULGARIS: Primary | ICD-10-CM

## 2023-04-11 DIAGNOSIS — I50.9 CHRONIC HEART FAILURE, UNSPECIFIED HEART FAILURE TYPE: ICD-10-CM

## 2023-04-11 DIAGNOSIS — L40.50 PSORIATIC ARTHRITIS: ICD-10-CM

## 2023-04-11 DIAGNOSIS — Z79.899 LONG-TERM USE OF HIGH-RISK MEDICATION: ICD-10-CM

## 2023-04-11 DIAGNOSIS — L40.0 PSORIASIS VULGARIS: ICD-10-CM

## 2023-04-11 LAB
HBV CORE AB SERPL QL IA: NORMAL
HBV SURFACE AB SER-ACNC: <3 MIU/ML
HBV SURFACE AB SER-ACNC: NORMAL M[IU]/ML
HBV SURFACE AG SERPL QL IA: NORMAL

## 2023-04-11 PROCEDURE — 86706 HEP B SURFACE ANTIBODY: CPT | Mod: 91 | Performed by: DERMATOLOGY

## 2023-04-11 PROCEDURE — 99214 OFFICE O/P EST MOD 30 MIN: CPT | Mod: S$GLB,,, | Performed by: DERMATOLOGY

## 2023-04-11 PROCEDURE — 99214 PR OFFICE/OUTPT VISIT, EST, LEVL IV, 30-39 MIN: ICD-10-PCS | Mod: S$GLB,,, | Performed by: DERMATOLOGY

## 2023-04-11 PROCEDURE — 1160F PR REVIEW ALL MEDS BY PRESCRIBER/CLIN PHARMACIST DOCUMENTED: ICD-10-PCS | Mod: CPTII,S$GLB,, | Performed by: DERMATOLOGY

## 2023-04-11 PROCEDURE — 1159F MED LIST DOCD IN RCRD: CPT | Mod: CPTII,S$GLB,, | Performed by: DERMATOLOGY

## 2023-04-11 PROCEDURE — 1159F PR MEDICATION LIST DOCUMENTED IN MEDICAL RECORD: ICD-10-PCS | Mod: CPTII,S$GLB,, | Performed by: DERMATOLOGY

## 2023-04-11 PROCEDURE — 1160F RVW MEDS BY RX/DR IN RCRD: CPT | Mod: CPTII,S$GLB,, | Performed by: DERMATOLOGY

## 2023-04-11 PROCEDURE — 86704 HEP B CORE ANTIBODY TOTAL: CPT | Performed by: DERMATOLOGY

## 2023-04-11 PROCEDURE — 36415 COLL VENOUS BLD VENIPUNCTURE: CPT | Mod: PO | Performed by: DERMATOLOGY

## 2023-04-11 PROCEDURE — 87340 HEPATITIS B SURFACE AG IA: CPT | Performed by: DERMATOLOGY

## 2023-04-11 RX ORDER — VALACYCLOVIR HYDROCHLORIDE 500 MG/1
500 TABLET, FILM COATED ORAL 2 TIMES DAILY
Qty: 30 TABLET | Refills: 3 | Status: SHIPPED | OUTPATIENT
Start: 2023-04-11 | End: 2023-04-24

## 2023-04-11 RX ORDER — SECUKINUMAB 150 MG/ML
INJECTION SUBCUTANEOUS
Qty: 8 ML | Refills: 1 | Status: SHIPPED | OUTPATIENT
Start: 2023-04-11 | End: 2023-08-15

## 2023-04-11 NOTE — PROGRESS NOTES
"  Patient Information  Name: Clinton Weller  : 1960  MRN: [id number]     Referring Physician:  No ref. provider found   Primary Care Physician:  Brittney Weaver MD   Date of Visit: 2023      Subjective:     History of Present lllness:    Clinton Weller is a 62 y.o. male who presents with a chief complaint of psoriasis.  Diagnosis: Psoriasis vulgaris, Psoriatic arthritis  Location: arms, legs, upper back, joint pain all over body over the past 4-5 months  Signs/Symptoms: psoriasis on skin is spreading, he is still experiencing a lot of joint pain especially in the morning  Symptom course: worsening joint pain  Current treatment: Otezla BID, clobetasol    No personal or family history of IBD.    Patient was last seen: 2022.  Prior notes by myself reviewed.   Clinical documentation obtained by nursing staff reviewed.    Review of Systems   Constitutional:  Negative for weight loss and weight gain.   HENT:  Negative for mouth sores.    Respiratory:  Negative for shortness of breath.    Gastrointestinal:  Negative for nausea, vomiting, abdominal pain, diarrhea and constipation.   Musculoskeletal:  Positive for arthralgias. Negative for myalgias, joint swelling and muscle weakness.        Worsening joint pain all over body, very bad in morning until he can "loosen up" by working out in AM   Skin:  Positive for itching, rash and dry skin.   Psychiatric/Behavioral:  Negative for depressed mood.      Objective:   Physical Exam   Constitutional: He appears well-developed and well-nourished. No distress.   Neurological: He is alert and oriented to person, place, and time. He is not disoriented.   Psychiatric: He has a normal mood and affect.   Skin:   Areas Examined (abnormalities noted in diagram):   Head / Face Inspection Performed  Neck Inspection Performed  Chest / Axilla Inspection Performed  Abdomen Inspection Performed  Back Inspection Performed  RUE Inspected  LUE Inspection Performed  RLE " Inspected  LLE Inspection Performed          Diagram Legend     Erythematous scaling macule/papule c/w actinic keratosis       Vascular papule c/w angioma      Pigmented verrucoid papule/plaque c/w seborrheic keratosis      Yellow umbilicated papule c/w sebaceous hyperplasia      Irregularly shaped tan macule c/w lentigo     1-2 mm smooth white papules consistent with Milia      Movable subcutaneous cyst with punctum c/w epidermal inclusion cyst      Subcutaneous movable cyst c/w pilar cyst      Firm pink to brown papule c/w dermatofibroma      Pedunculated fleshy papule(s) c/w skin tag(s)      Evenly pigmented macule c/w junctional nevus     Mildly variegated pigmented, slightly irregular-bordered macule c/w mildly atypical nevus      Flesh colored to evenly pigmented papule c/w intradermal nevus       Pink pearly papule/plaque c/w basal cell carcinoma      Erythematous hyperkeratotic cursted plaque c/w SCC      Surgical scar with no sign of skin cancer recurrence      Open and closed comedones      Inflammatory papules and pustules      Verrucoid papule consistent consistent with wart     Erythematous eczematous patches and plaques     Dystrophic onycholytic nail with subungual debris c/w onychomycosis     Umbilicated papule    Erythematous-base heme-crusted tan verrucoid plaque consistent with inflamed seborrheic keratosis     Erythematous Silvery Scaling Plaque c/w Psoriasis     See annotation    No images are attached to the encounter or orders placed in the encounter.      [x] Data reviewed  [x] Prior external notes reviewed  [] Independent review of test  [] Management discussed with another provider  [] Independent historian    Assessment / Plan:        Psoriasis vulgaris  Psoriatic arthritis  Long-term use of high-risk medication  - chronic problem with exacerbation/progression  Given the extensive and refractory nature of his psoriasis and PsA and his history of heart failure, will avoid TNF alpha  inhibitors and will proceed with anti-IL17 therapy.   Discussed benefits and risks of secukinumab (Cosentyx), including but not limited to injection site reactions, increased risk of infections especially candidiasis/tinea, reactivation of tuberculosis, and new onset inflammatory bowel disease. Patient is to call with any side effects. Patient should discontinue Cosentyx and notify our office if an infection develops. Live vaccines should be avoided while on this medication.    Check hepatitis C, HIV, and Quant gold were reviewed. Check hep B labs prior to initiating. Will need CBC and LFTs q3mo x 2, then q3-6mo. Will obtain yearly quantiferon gold.   Start secukinumab 300 mg SC qwk x 5wk, then 300 mg SC q4wk.    -     Hepatitis B Core Antibody, Total; Future; Expected date: 04/11/2023  -     Hepatitis B Surface Antigen; Future; Expected date: 04/11/2023  -     Hepatitis B Surface Ab, Qualitative; Future; Expected date: 04/11/2023  -     COSENTYX PEN, 2 PENS, 150 mg/mL PnIj; Inject 300mg SQ qweek x 5 weeks then inject 300mg SQ q 4 weeks  Dispense: 8 mL; Refill: 1    Hypertrophic obstructive cardiomyopathy (HOCM)  Chronic heart failure, unspecified heart failure type  Avoid TNF alpha inhibitors.    Herpes simplex infection of skin  -     valACYclovir (VALTREX) 500 MG tablet; Take 1 tablet (500 mg total) by mouth 2 (two) times daily. Take at first sign of symptoms of outbreak. for 3 days  Dispense: 30 tablet; Refill: 3    Follow up in about 3 months (around 7/11/2023).      Marilin Rodriguez MD, FAAD  [facility]

## 2023-04-13 ENCOUNTER — SPECIALTY PHARMACY (OUTPATIENT)
Dept: PHARMACY | Facility: CLINIC | Age: 63
End: 2023-04-13
Payer: COMMERCIAL

## 2023-04-14 NOTE — TELEPHONE ENCOUNTER
Hello, this is Eugene Morse with [facility].  We are working on your prescription that your doctor has sent us. We will be working with your insurance to get this approved for you. We will be calling you along the way with updates on your medication.  If you have any questions, you can reach us at [phone].    Welcome call outcome: Left voicemail

## 2023-04-17 ENCOUNTER — OFFICE VISIT (OUTPATIENT)
Dept: FAMILY MEDICINE | Facility: CLINIC | Age: 63
End: 2023-04-17
Payer: COMMERCIAL

## 2023-04-17 VITALS
RESPIRATION RATE: 16 BRPM | HEART RATE: 59 BPM | SYSTOLIC BLOOD PRESSURE: 138 MMHG | TEMPERATURE: 98 F | HEIGHT: 75 IN | BODY MASS INDEX: 26.75 KG/M2 | WEIGHT: 215.13 LBS | DIASTOLIC BLOOD PRESSURE: 74 MMHG

## 2023-04-17 DIAGNOSIS — R05.3 PERSISTENT COUGH: Primary | ICD-10-CM

## 2023-04-17 PROCEDURE — 1159F MED LIST DOCD IN RCRD: CPT | Mod: CPTII,S$GLB,, | Performed by: NURSE PRACTITIONER

## 2023-04-17 PROCEDURE — 3008F BODY MASS INDEX DOCD: CPT | Mod: CPTII,S$GLB,, | Performed by: NURSE PRACTITIONER

## 2023-04-17 PROCEDURE — 99999 PR PBB SHADOW E&M-EST. PATIENT-LVL IV: CPT | Mod: PBBFAC,,, | Performed by: NURSE PRACTITIONER

## 2023-04-17 PROCEDURE — 3078F PR MOST RECENT DIASTOLIC BLOOD PRESSURE < 80 MM HG: ICD-10-PCS | Mod: CPTII,S$GLB,, | Performed by: NURSE PRACTITIONER

## 2023-04-17 PROCEDURE — 3008F PR BODY MASS INDEX (BMI) DOCUMENTED: ICD-10-PCS | Mod: CPTII,S$GLB,, | Performed by: NURSE PRACTITIONER

## 2023-04-17 PROCEDURE — 3075F SYST BP GE 130 - 139MM HG: CPT | Mod: CPTII,S$GLB,, | Performed by: NURSE PRACTITIONER

## 2023-04-17 PROCEDURE — 99213 OFFICE O/P EST LOW 20 MIN: CPT | Mod: S$GLB,,, | Performed by: NURSE PRACTITIONER

## 2023-04-17 PROCEDURE — 3078F DIAST BP <80 MM HG: CPT | Mod: CPTII,S$GLB,, | Performed by: NURSE PRACTITIONER

## 2023-04-17 PROCEDURE — 3075F PR MOST RECENT SYSTOLIC BLOOD PRESS GE 130-139MM HG: ICD-10-PCS | Mod: CPTII,S$GLB,, | Performed by: NURSE PRACTITIONER

## 2023-04-17 PROCEDURE — 99999 PR PBB SHADOW E&M-EST. PATIENT-LVL IV: ICD-10-PCS | Mod: PBBFAC,,, | Performed by: NURSE PRACTITIONER

## 2023-04-17 PROCEDURE — 99213 PR OFFICE/OUTPT VISIT, EST, LEVL III, 20-29 MIN: ICD-10-PCS | Mod: S$GLB,,, | Performed by: NURSE PRACTITIONER

## 2023-04-17 PROCEDURE — 1160F RVW MEDS BY RX/DR IN RCRD: CPT | Mod: CPTII,S$GLB,, | Performed by: NURSE PRACTITIONER

## 2023-04-17 PROCEDURE — 1160F PR REVIEW ALL MEDS BY PRESCRIBER/CLIN PHARMACIST DOCUMENTED: ICD-10-PCS | Mod: CPTII,S$GLB,, | Performed by: NURSE PRACTITIONER

## 2023-04-17 PROCEDURE — 1159F PR MEDICATION LIST DOCUMENTED IN MEDICAL RECORD: ICD-10-PCS | Mod: CPTII,S$GLB,, | Performed by: NURSE PRACTITIONER

## 2023-04-17 RX ORDER — METHYLPREDNISOLONE 4 MG/1
TABLET ORAL
Qty: 1 EACH | Refills: 0 | Status: SHIPPED | OUTPATIENT
Start: 2023-04-17 | End: 2023-08-15

## 2023-04-17 RX ORDER — LEVOCETIRIZINE DIHYDROCHLORIDE 5 MG/1
5 TABLET, FILM COATED ORAL NIGHTLY
Qty: 30 TABLET | Refills: 11 | Status: SHIPPED | OUTPATIENT
Start: 2023-04-17 | End: 2023-08-15

## 2023-04-17 RX ORDER — FLUTICASONE PROPIONATE 50 MCG
1 SPRAY, SUSPENSION (ML) NASAL DAILY
Qty: 16 G | Refills: 3 | Status: SHIPPED | OUTPATIENT
Start: 2023-04-17

## 2023-04-17 NOTE — PROGRESS NOTES
Subjective:       Patient ID: Clinton Weller is a 62 y.o. male.    Chief Complaint: Cough  Clinton Weller presents today for Evaluation & management of cough. Last seen in 2023. This is his first visit with me.     Cough  This is a recurrent problem. The current episode started 1 to 4 weeks ago. The problem has been unchanged. The cough is Productive of sputum. Associated symptoms include shortness of breath (chronic) and wheezing (?). Pertinent negatives include no chest pain, ear pain, fever, headaches, myalgias, rhinorrhea or sore throat. The symptoms are aggravated by other. He has tried nothing for the symptoms. His past medical history is significant for environmental allergies.     Patient Active Problem List   Diagnosis    Hypertrophic cardiomyopathy    Heterozygous MTHFR mutation [id number]    Elevated homocysteine    Implantable cardioverter-defibrillator (ICD) in situ    Hypertrophic obstructive cardiomyopathy (HOCM)    Coenzyme Q deficiency    AVNRT (AV neri re-entry tachycardia)    Ventricular tachycardia    Deficit of vestibulo-ocular reflex    Panic anxiety syndrome    History of cardiac radiofrequency ablation    COOPER (obstructive sleep apnea)    Psoriasis vulgaris    Psoriatic arthritis       Current Outpatient Medications:     clobetasoL (TEMOVATE) 0.05 % cream, SMARTSI Topical Daily PRN, Disp: , Rfl:     COSENTYX PEN, 2 PENS, 150 mg/mL PnIj, Inject 300mg SQ qweek x 5 weeks then inject 300mg SQ q 4 weeks, Disp: 8 mL, Rfl: 1    furosemide (LASIX) 20 MG tablet, TAKE 1 TABLET BY MOUTH EVERY DAY AS NEEDED, Disp: 90 tablet, Rfl: 2    LORazepam (ATIVAN) 0.5 MG tablet, Take 1 tablet (0.5 mg total) by mouth every 8 (eight) hours as needed., Disp: 20 tablet, Rfl: 1    OTEZLA 30 mg Tab, TAKE 1 TABLET BY MOUTH 2 TIMES A DAY., Disp: 60 tablet, Rfl: 3    fluticasone propionate (FLONASE) 50 mcg/actuation nasal spray, 1 spray (50 mcg total) by Each Nostril route once daily., Disp: 16 g, Rfl: 3     "levocetirizine (XYZAL) 5 MG tablet, Take 1 tablet (5 mg total) by mouth every evening., Disp: 30 tablet, Rfl: 11    methylPREDNISolone (MEDROL DOSEPACK) 4 mg tablet, use as directed, Disp: 1 each, Rfl: 0    valACYclovir (VALTREX) 500 MG tablet, Take 1 tablet (500 mg total) by mouth 2 (two) times daily. Take at first sign of symptoms of outbreak. for 3 days, Disp: 30 tablet, Rfl: 3    The following portions of the patient's history were reviewed and updated as appropriate: allergies, past family history, past medical history, past social history and past surgical history.    Review of Systems   Constitutional:  Negative for fatigue, fever and unexpected weight change.   HENT:  Negative for ear pain, hearing loss, rhinorrhea, sneezing and sore throat.    Eyes:  Negative for visual disturbance.   Respiratory:  Positive for cough, shortness of breath (chronic) and wheezing (?).    Cardiovascular:  Negative for chest pain and palpitations.   Gastrointestinal:  Negative for abdominal pain, blood in stool, constipation, diarrhea, nausea and vomiting.   Genitourinary:  Negative for difficulty urinating, frequency and hematuria.   Musculoskeletal:  Negative for arthralgias and myalgias.   Allergic/Immunologic: Positive for environmental allergies.   Neurological:  Negative for dizziness, tremors and headaches.   Psychiatric/Behavioral:  Negative for dysphoric mood and sleep disturbance. The patient is not nervous/anxious.      Objective:      /74 (BP Location: Right arm, Patient Position: Sitting, BP Method: Large (Automatic))   Pulse (!) 59   Temp 97.9 °F (36.6 °C) (Oral)   Resp 16   Ht 6' 3" (1.905 m)   Wt 97.6 kg (215 lb 1.6 oz)   BMI 26.89 kg/m²     Physical Exam  Constitutional:       General: He is not in acute distress.     Appearance: Normal appearance.   Cardiovascular:      Rate and Rhythm: Normal rate and regular rhythm.      Pulses: Normal pulses.      Heart sounds: Normal heart sounds.   Pulmonary: "      Effort: Pulmonary effort is normal.      Breath sounds: Normal breath sounds.   Musculoskeletal:         General: Normal range of motion.   Skin:     General: Skin is warm and dry.   Neurological:      Mental Status: He is alert and oriented to person, place, and time.   Psychiatric:         Mood and Affect: Mood normal.         Behavior: Behavior normal.       Assessment:       1. Persistent cough        Plan:   Clinton was seen today for cough.    Diagnoses and all orders for this visit:    Persistent cough  -     methylPREDNISolone (MEDROL DOSEPACK) 4 mg tablet; use as directed  -     X-Ray Chest PA And Lateral; Future  -     levocetirizine (XYZAL) 5 MG tablet; Take 1 tablet (5 mg total) by mouth every evening.  -     fluticasone propionate (FLONASE) 50 mcg/actuation nasal spray; 1 spray (50 mcg total) by Each Nostril route once daily.      Reactive vs. Viral; will get cxr if no improvement s/p oral steriods.

## 2023-04-17 NOTE — PATIENT INSTRUCTIONS
Clinton,     We are always striving for excellence. Should you receive a patient experience survey via text message, electronically, or by mail, we would appreciate if you would take a few moments to give us your feedback. These surveys let us know our strengths as well as areas of opportunity for improvement to better serve you.    Thank you for your time,  Deepika Rajput LPN      Your test results will be communicated to you via : My [facility], Telephone or Letter.   If you have not received your test results in one week, please contact the clinic at [phone].

## 2023-04-27 NOTE — TELEPHONE ENCOUNTER
Cosentyx is approved through 4/20/2024. [facility] is out of network with plan. Rx has been routed to [facility]. Patient has been notified. Will decline patient enrollment at [facility].

## 2023-05-11 ENCOUNTER — TELEPHONE (OUTPATIENT)
Dept: DERMATOLOGY | Facility: CLINIC | Age: 63
End: 2023-05-11
Payer: COMMERCIAL

## 2023-05-11 NOTE — TELEPHONE ENCOUNTER
----- Message from Dawson Mcgee MA sent at 5/10/2023  3:15 PM CDT -----  What is the request in detail: Requesting call back to discuss whether or not the patient has had a TB test (negative) prior to dispensing the following. Please advise.     COSENTYX PEN, 2 PENS, 150 mg/mL PnIj 8 mL 1 4/11/2023 Yes   Sig: Inject 300mg SQ qweek x 5 weeks then inject 300mg SQ q 4 weeks   Sent to pharmacy as: COSENTYX PEN, 2 PENS, 150 mg/mL PnIj   Class: Normal   Order: [id number]   Date/Time Signed: 4/11/2023 09:05   E-Prescribing Status: Receipt confirmed by pharmacy (4/27/2023 10:45 AM CDT)   Prior authorization: Approved

## 2023-05-15 ENCOUNTER — TELEPHONE (OUTPATIENT)
Dept: SLEEP MEDICINE | Facility: CLINIC | Age: 63
End: 2023-05-15
Payer: COMMERCIAL

## 2023-05-15 NOTE — TELEPHONE ENCOUNTER
Staff reached out to patient contact to inform them of their upcoming appointment.  Staff was communicated by patient that they will be present for their appointment.

## 2023-05-16 ENCOUNTER — OFFICE VISIT (OUTPATIENT)
Dept: SLEEP MEDICINE | Facility: CLINIC | Age: 63
End: 2023-05-16
Payer: COMMERCIAL

## 2023-05-16 VITALS
HEIGHT: 75 IN | WEIGHT: 215.19 LBS | BODY MASS INDEX: 26.76 KG/M2 | HEART RATE: 76 BPM | SYSTOLIC BLOOD PRESSURE: 124 MMHG | DIASTOLIC BLOOD PRESSURE: 85 MMHG

## 2023-05-16 DIAGNOSIS — G47.33 OSA (OBSTRUCTIVE SLEEP APNEA): Primary | ICD-10-CM

## 2023-05-16 PROCEDURE — 3079F DIAST BP 80-89 MM HG: CPT | Mod: CPTII,S$GLB,, | Performed by: PHYSICIAN ASSISTANT

## 2023-05-16 PROCEDURE — 99999 PR PBB SHADOW E&M-EST. PATIENT-LVL III: CPT | Mod: PBBFAC,,, | Performed by: PHYSICIAN ASSISTANT

## 2023-05-16 PROCEDURE — 1160F RVW MEDS BY RX/DR IN RCRD: CPT | Mod: CPTII,S$GLB,, | Performed by: PHYSICIAN ASSISTANT

## 2023-05-16 PROCEDURE — 3008F PR BODY MASS INDEX (BMI) DOCUMENTED: ICD-10-PCS | Mod: CPTII,S$GLB,, | Performed by: PHYSICIAN ASSISTANT

## 2023-05-16 PROCEDURE — 99999 PR PBB SHADOW E&M-EST. PATIENT-LVL III: ICD-10-PCS | Mod: PBBFAC,,, | Performed by: PHYSICIAN ASSISTANT

## 2023-05-16 PROCEDURE — 1160F PR REVIEW ALL MEDS BY PRESCRIBER/CLIN PHARMACIST DOCUMENTED: ICD-10-PCS | Mod: CPTII,S$GLB,, | Performed by: PHYSICIAN ASSISTANT

## 2023-05-16 PROCEDURE — 1159F PR MEDICATION LIST DOCUMENTED IN MEDICAL RECORD: ICD-10-PCS | Mod: CPTII,S$GLB,, | Performed by: PHYSICIAN ASSISTANT

## 2023-05-16 PROCEDURE — 1159F MED LIST DOCD IN RCRD: CPT | Mod: CPTII,S$GLB,, | Performed by: PHYSICIAN ASSISTANT

## 2023-05-16 PROCEDURE — 99213 OFFICE O/P EST LOW 20 MIN: CPT | Mod: S$GLB,,, | Performed by: PHYSICIAN ASSISTANT

## 2023-05-16 PROCEDURE — 3074F SYST BP LT 130 MM HG: CPT | Mod: CPTII,S$GLB,, | Performed by: PHYSICIAN ASSISTANT

## 2023-05-16 PROCEDURE — 3079F PR MOST RECENT DIASTOLIC BLOOD PRESSURE 80-89 MM HG: ICD-10-PCS | Mod: CPTII,S$GLB,, | Performed by: PHYSICIAN ASSISTANT

## 2023-05-16 PROCEDURE — 3074F PR MOST RECENT SYSTOLIC BLOOD PRESSURE < 130 MM HG: ICD-10-PCS | Mod: CPTII,S$GLB,, | Performed by: PHYSICIAN ASSISTANT

## 2023-05-16 PROCEDURE — 99213 PR OFFICE/OUTPT VISIT, EST, LEVL III, 20-29 MIN: ICD-10-PCS | Mod: S$GLB,,, | Performed by: PHYSICIAN ASSISTANT

## 2023-05-16 PROCEDURE — 3008F BODY MASS INDEX DOCD: CPT | Mod: CPTII,S$GLB,, | Performed by: PHYSICIAN ASSISTANT

## 2023-05-16 NOTE — PROGRESS NOTES
Referred by No ref. provider found     ESTABLISHED PATIENT VISIT  New to me. Previously evaluated by Dr. Monk.      Clinton Weller  is a pleasant 62 y.o. male  with PMH significant for hypertrophic cardiomyopathy, ICD, and COOPER dx circa .         Reports wearing nightly with great benefit. Denies issues at this time. Here today for orders for new supplies.      PAP history   Problems No issues   Mask FFM   Pressure 11.5cwp   DME [facility]   Machine age DS2: Circa 2022   Download 5.16.23: 29/30 x 7hrs, 11.5cwp, mask fit 100%, AHI 3.5       Past Medical History:   Diagnosis Date    AICD (automatic cardioverter/defibrillator) present     Allergy     Sulfa drugs    Asthma 1974    Exercise-induced    Defibrillator discharge     Heart attack     Hypertrophic Cardiomyopathy    History of acne     Cystic acne    Hypertrophic cardiomyopathy     Joint pain     Pacemaker     Psoriasis     Psoriatic arthritis 2023    Skin disease 1980    Cystic acne     Patient Active Problem List   Diagnosis    Hypertrophic cardiomyopathy    Heterozygous MTHFR mutation [id number]    Elevated homocysteine    Implantable cardioverter-defibrillator (ICD) in situ    Hypertrophic obstructive cardiomyopathy (HOCM)    Coenzyme Q deficiency    AVNRT (AV neri re-entry tachycardia)    Ventricular tachycardia    Deficit of vestibulo-ocular reflex    Panic anxiety syndrome    History of cardiac radiofrequency ablation    COOPER (obstructive sleep apnea)    Psoriasis vulgaris    Psoriatic arthritis       Current Outpatient Medications:     clobetasoL (TEMOVATE) 0.05 % cream, SMARTSI Topical Daily PRN, Disp: , Rfl:     COSENTYX PEN, 2 PENS, 150 mg/mL PnIj, Inject 300mg SQ qweek x 5 weeks then inject 300mg SQ q 4 weeks, Disp: 8 mL, Rfl: 1    fluticasone propionate (FLONASE) 50 mcg/actuation nasal spray, 1 spray (50 mcg total) by Each Nostril route once daily., Disp: 16 g, Rfl: 3    furosemide (LASIX) 20 MG tablet, TAKE 1  "TABLET BY MOUTH EVERY DAY AS NEEDED, Disp: 90 tablet, Rfl: 2    levocetirizine (XYZAL) 5 MG tablet, Take 1 tablet (5 mg total) by mouth every evening., Disp: 30 tablet, Rfl: 11    methylPREDNISolone (MEDROL DOSEPACK) 4 mg tablet, use as directed, Disp: 1 each, Rfl: 0    LORazepam (ATIVAN) 0.5 MG tablet, Take 1 tablet (0.5 mg total) by mouth every 8 (eight) hours as needed., Disp: 20 tablet, Rfl: 1    OTEZLA 30 mg Tab, TAKE 1 TABLET BY MOUTH 2 TIMES A DAY., Disp: 60 tablet, Rfl: 3    valACYclovir (VALTREX) 500 MG tablet, TAKE 1 TABLET (500 MG TOTAL) BY MOUTH 2 (TWO) TIMES DAILY. TAKE AT FIRST SIGN OF SYMPTOMS OF OUTBREAK. FOR 3 DAYS, Disp: 30 tablet, Rfl: 3       Vitals:    05/16/23 0800   BP: 124/85   BP Location: Right arm   Patient Position: Sitting   BP Method: Medium (Automatic)   Pulse: 76   Weight: 97.6 kg (215 lb 2.7 oz)   Height: 6' 3" (1.905 m)     Physical Exam:    GEN:   Well-appearing  Psych:  Appropriate affect, demonstrates insight  SKIN:  No rash on the face or bridge of the nose      LABS:   Lab Results   Component Value Date    HGB 15.2 12/27/2022    CO2 26 12/27/2022       RECORDS REVIEWED PREVIOUSLY:    Outside records scanned into Epic:  PSG 3/2016 showed AHI 86.1  CPAP titration 12/16 showed AHI 0 at 8 cm H20    ASSESSMENT      PROBLEM DESCRIPTION/ Sx on Presentation Interval Hx STATUS   COOPER   Dx circa 2012  Outside PSG 2016 AHI 86.1   Wearing CPAP nightly with great improvement in quality of sleep  Controlled    Daytime Sx   + sleepiness when inactive   ESS n/a/24 on intake Wearing CPAP nightly with significant improvement in daytime sleepiness  improved       PLAN     -using and benefiting from CPAP therapy  -continue CPAP 11.5cwp nightly  -CPAP supplies ordered to [facility]  -discussed COOPER and CPAP with patient in detail, including possible complications of untreated COOPER like heart attack/stroke  -advised on strict driving precautions; advised never to drive drowsy    Advised on plan " of care. Answered all patient questions. Patient verbalized understanding and voiced agreement with plan of care.       RTC 12 months or as needed       The patient was given open opportunity to ask questions and/or express concerns about treatment plan.   All questions/concerns were discussed.     Two patient identifiers used prior to evaluation.

## 2023-05-24 ENCOUNTER — PATIENT MESSAGE (OUTPATIENT)
Dept: SLEEP MEDICINE | Facility: CLINIC | Age: 63
End: 2023-05-24
Payer: COMMERCIAL

## 2023-05-31 ENCOUNTER — CLINICAL SUPPORT (OUTPATIENT)
Dept: CARDIOLOGY | Facility: HOSPITAL | Age: 63
End: 2023-05-31
Payer: COMMERCIAL

## 2023-05-31 DIAGNOSIS — Z95.810 PRESENCE OF AUTOMATIC (IMPLANTABLE) CARDIAC DEFIBRILLATOR: ICD-10-CM

## 2023-05-31 PROCEDURE — 93295 DEV INTERROG REMOTE 1/2/MLT: CPT | Mod: ,,, | Performed by: STUDENT IN AN ORGANIZED HEALTH CARE EDUCATION/TRAINING PROGRAM

## 2023-05-31 PROCEDURE — 93296 REM INTERROG EVL PM/IDS: CPT | Performed by: STUDENT IN AN ORGANIZED HEALTH CARE EDUCATION/TRAINING PROGRAM

## 2023-05-31 PROCEDURE — 93295 CARDIAC DEVICE CHECK - REMOTE: ICD-10-PCS | Mod: ,,, | Performed by: STUDENT IN AN ORGANIZED HEALTH CARE EDUCATION/TRAINING PROGRAM

## 2023-07-11 ENCOUNTER — PATIENT MESSAGE (OUTPATIENT)
Dept: TRANSPLANT | Facility: CLINIC | Age: 63
End: 2023-07-11
Payer: COMMERCIAL

## 2023-08-14 ENCOUNTER — LAB VISIT (OUTPATIENT)
Dept: LAB | Facility: HOSPITAL | Age: 63
End: 2023-08-14
Attending: INTERNAL MEDICINE
Payer: COMMERCIAL

## 2023-08-14 DIAGNOSIS — I42.2 HYPERTROPHIC CARDIOMYOPATHY: ICD-10-CM

## 2023-08-14 LAB
ALBUMIN SERPL BCP-MCNC: 4.1 G/DL (ref 3.5–5.2)
ALP SERPL-CCNC: 76 U/L (ref 55–135)
ALT SERPL W/O P-5'-P-CCNC: 27 U/L (ref 10–44)
ANION GAP SERPL CALC-SCNC: 8 MMOL/L (ref 8–16)
AST SERPL-CCNC: 30 U/L (ref 10–40)
BASOPHILS # BLD AUTO: 0.02 K/UL (ref 0–0.2)
BASOPHILS NFR BLD: 0.3 % (ref 0–1.9)
BILIRUB SERPL-MCNC: 1.3 MG/DL (ref 0.1–1)
BUN SERPL-MCNC: 11 MG/DL (ref 8–23)
CALCIUM SERPL-MCNC: 9.3 MG/DL (ref 8.7–10.5)
CHLORIDE SERPL-SCNC: 105 MMOL/L (ref 95–110)
CO2 SERPL-SCNC: 28 MMOL/L (ref 23–29)
CREAT SERPL-MCNC: 0.9 MG/DL (ref 0.5–1.4)
DIFFERENTIAL METHOD: ABNORMAL
EOSINOPHIL # BLD AUTO: 0.2 K/UL (ref 0–0.5)
EOSINOPHIL NFR BLD: 3 % (ref 0–8)
ERYTHROCYTE [DISTWIDTH] IN BLOOD BY AUTOMATED COUNT: 12.7 % (ref 11.5–14.5)
EST. GFR  (NO RACE VARIABLE): >60 ML/MIN/1.73 M^2
GLUCOSE SERPL-MCNC: 99 MG/DL (ref 70–110)
HCT VFR BLD AUTO: 47.2 % (ref 40–54)
HGB BLD-MCNC: 15.6 G/DL (ref 14–18)
IMM GRANULOCYTES # BLD AUTO: 0.02 K/UL (ref 0–0.04)
IMM GRANULOCYTES NFR BLD AUTO: 0.3 % (ref 0–0.5)
LYMPHOCYTES # BLD AUTO: 2.6 K/UL (ref 1–4.8)
LYMPHOCYTES NFR BLD: 42.8 % (ref 18–48)
MCH RBC QN AUTO: 31.1 PG (ref 27–31)
MCHC RBC AUTO-ENTMCNC: 33.1 G/DL (ref 32–36)
MCV RBC AUTO: 94 FL (ref 82–98)
MONOCYTES # BLD AUTO: 0.3 K/UL (ref 0.3–1)
MONOCYTES NFR BLD: 5.6 % (ref 4–15)
NEUTROPHILS # BLD AUTO: 2.9 K/UL (ref 1.8–7.7)
NEUTROPHILS NFR BLD: 48 % (ref 38–73)
NRBC BLD-RTO: 0 /100 WBC
PLATELET # BLD AUTO: 183 K/UL (ref 150–450)
PMV BLD AUTO: 10.9 FL (ref 9.2–12.9)
POTASSIUM SERPL-SCNC: 5 MMOL/L (ref 3.5–5.1)
PROT SERPL-MCNC: 6.8 G/DL (ref 6–8.4)
RBC # BLD AUTO: 5.01 M/UL (ref 4.6–6.2)
SODIUM SERPL-SCNC: 141 MMOL/L (ref 136–145)
WBC # BLD AUTO: 6.07 K/UL (ref 3.9–12.7)

## 2023-08-14 PROCEDURE — 36415 COLL VENOUS BLD VENIPUNCTURE: CPT | Mod: PO | Performed by: INTERNAL MEDICINE

## 2023-08-14 PROCEDURE — 83880 ASSAY OF NATRIURETIC PEPTIDE: CPT | Performed by: INTERNAL MEDICINE

## 2023-08-14 PROCEDURE — 80053 COMPREHEN METABOLIC PANEL: CPT | Performed by: INTERNAL MEDICINE

## 2023-08-14 PROCEDURE — 85025 COMPLETE CBC W/AUTO DIFF WBC: CPT | Performed by: INTERNAL MEDICINE

## 2023-08-15 ENCOUNTER — OFFICE VISIT (OUTPATIENT)
Dept: TRANSPLANT | Facility: CLINIC | Age: 63
End: 2023-08-15
Payer: COMMERCIAL

## 2023-08-15 VITALS
WEIGHT: 218.69 LBS | SYSTOLIC BLOOD PRESSURE: 137 MMHG | DIASTOLIC BLOOD PRESSURE: 78 MMHG | BODY MASS INDEX: 27.19 KG/M2 | HEIGHT: 75 IN | HEART RATE: 62 BPM

## 2023-08-15 DIAGNOSIS — G47.33 OSA (OBSTRUCTIVE SLEEP APNEA): ICD-10-CM

## 2023-08-15 DIAGNOSIS — Z95.810 IMPLANTABLE CARDIOVERTER-DEFIBRILLATOR (ICD) IN SITU: ICD-10-CM

## 2023-08-15 DIAGNOSIS — I42.2 HYPERTROPHIC CARDIOMYOPATHY: Primary | ICD-10-CM

## 2023-08-15 LAB — NT-PROBNP SERPL IA-MCNC: 218 PG/ML

## 2023-08-15 PROCEDURE — 3008F BODY MASS INDEX DOCD: CPT | Mod: CPTII,S$GLB,, | Performed by: INTERNAL MEDICINE

## 2023-08-15 PROCEDURE — 3075F PR MOST RECENT SYSTOLIC BLOOD PRESS GE 130-139MM HG: ICD-10-PCS | Mod: CPTII,S$GLB,, | Performed by: INTERNAL MEDICINE

## 2023-08-15 PROCEDURE — 1159F MED LIST DOCD IN RCRD: CPT | Mod: CPTII,S$GLB,, | Performed by: INTERNAL MEDICINE

## 2023-08-15 PROCEDURE — 3078F PR MOST RECENT DIASTOLIC BLOOD PRESSURE < 80 MM HG: ICD-10-PCS | Mod: CPTII,S$GLB,, | Performed by: INTERNAL MEDICINE

## 2023-08-15 PROCEDURE — 3075F SYST BP GE 130 - 139MM HG: CPT | Mod: CPTII,S$GLB,, | Performed by: INTERNAL MEDICINE

## 2023-08-15 PROCEDURE — 99999 PR PBB SHADOW E&M-EST. PATIENT-LVL III: CPT | Mod: PBBFAC,,, | Performed by: INTERNAL MEDICINE

## 2023-08-15 PROCEDURE — 3078F DIAST BP <80 MM HG: CPT | Mod: CPTII,S$GLB,, | Performed by: INTERNAL MEDICINE

## 2023-08-15 PROCEDURE — 1159F PR MEDICATION LIST DOCUMENTED IN MEDICAL RECORD: ICD-10-PCS | Mod: CPTII,S$GLB,, | Performed by: INTERNAL MEDICINE

## 2023-08-15 PROCEDURE — 99214 PR OFFICE/OUTPT VISIT, EST, LEVL IV, 30-39 MIN: ICD-10-PCS | Mod: S$GLB,,, | Performed by: INTERNAL MEDICINE

## 2023-08-15 PROCEDURE — 3008F PR BODY MASS INDEX (BMI) DOCUMENTED: ICD-10-PCS | Mod: CPTII,S$GLB,, | Performed by: INTERNAL MEDICINE

## 2023-08-15 PROCEDURE — 99214 OFFICE O/P EST MOD 30 MIN: CPT | Mod: S$GLB,,, | Performed by: INTERNAL MEDICINE

## 2023-08-15 PROCEDURE — 99999 PR PBB SHADOW E&M-EST. PATIENT-LVL III: ICD-10-PCS | Mod: PBBFAC,,, | Performed by: INTERNAL MEDICINE

## 2023-08-15 NOTE — PATIENT INSTRUCTIONS
You have just the right amount of fluid on you.  Please adhere to a low sodium diet (no more than 1.5 grams of sodium in 24h).  3.   Follow fluid restriction of  1. no more than 2 liters in 24 hours..   4. No changes on medications.  5. F/u in 1 year.

## 2023-08-15 NOTE — PROGRESS NOTES
"                                                                                       [facility] Follow up.        Attending Physician: Bubba Osorio MD.  The patient's last visit with me was on 9/9/2022.         HPI.  62 Y male with history of hypertrophic cardiomyopathy, s/p ETOH ablation x 2, s/p VT ablation x2, PPM/ICD and in 2016 two failed shocks, rescued on the third shock for VT/VF.      Patient comes to establish care as he moved recently to [place] area from [place], [place].  He was previously being taken care of at [facility], and before that at [place] (Alexander Andrew MD / Jesus Matias MD / Clinton Bonilla MD)    Today August 15, 2023 he feels well. No CV symptoms. No limitations. He is doing a combination of aerobic and isometric exercise. His main issue is a whole body rash. He has not had ICD therapies. He follows up with Dr. Noble. Last device interrogation in office January 2023.      Previous history in [place]  Healthy and active childhood but h/o difficulty with exertion and distance, +murmur  2005 diagnosed with HOCM   2005 ETOH ablation at [facility], has a decline in his usually exercise. No medication trial prior.   2006 repeat ETOH ablation, syncope d/t complete AVB and PPM/ICD placed   2008 shock inappropriate for SVT   [id number] felt "great", able to return to his usually moderate-high intensity exercise regimen.   2014 generator change, increase symptom burden, AVNRT ablation     July 2016 - started CCB d/t increase symptoms and soon after had an increase burden of NSVT (patient's report), and then three shocks, first 2 failed and the third rescued, VT/VF. sustained VT of 214 bpm. ATP was not effective an accelerated the VT. First two shocks were not effective. Final shock changed the VT enough for it to terminate. Cath showed no significant CAD. ECHO showed no LVOT gradient. Underwent VT ablation on 7/12/2016. Frequent PVCs localized " to the LV septum from a discrete scarred region consistent with his ablated septum. Complete elimination of PVCs with homogenization of the scar. Highly fractioned signal along the right bundle and the left posterior fascicle was mapped; to prevent the likely occurrence of BBRT or interfascicular VT, the regions of fragmentation were ablated and a true RBBB with LPFB was created. Decreased bipolar sensing from the RV septal ICD lead was observed after ablations. DFT testing was then performed on 7/13/2016. RV lead sensing configuration was changed from bipolar to extended bipolar and appropriate sensing was confirmed during VF induction. DFT was confirmed to be 20 J or less with B>AX configuration with the SVC included in the circuit.   Past medical history  COOPER (CPAP)  Obesity  Murmur  Bradycardia           Review of Systems     Past Medical History:   Diagnosis Date    AICD (automatic cardioverter/defibrillator) present     Allergy 1998    Sulfa drugs    Asthma 1974    Exercise-induced    Defibrillator discharge 2005    Heart attack 2005    Hypertrophic Cardiomyopathy    History of acne 1981    Cystic acne    Hypertrophic cardiomyopathy     Joint pain 2002    Pacemaker     Psoriasis     Psoriatic arthritis 4/11/2023    Skin disease 1980    Cystic acne        Past Surgical History:   Procedure Laterality Date    COLONOSCOPY N/A 11/18/2022    Procedure: COLONOSCOPY;  Surgeon: Fran Zaragoza MD;  Location: [facility] ([address] [place] [address]);  Service: Endoscopy;  Laterality: N/A;  Fully vaccinated/ inst emailed-RB  11/11  precall [employer] PACEMAKER DEPENDENT- JLM        Family History   Problem Relation Age of Onset    Eczema Mother     Acne Brother     Acne Brother     Psoriasis Brother         Review of patient's allergies indicates:   Allergen Reactions    Penicillins Dermatitis, Rash, Shortness Of Breath, Hives and Other (See Comments)    Sulfa (sulfonamide antibiotics) Shortness Of Breath, Other (See Comments), Rash and  "Hives     Other reaction(s): Rash, Unspecified  Other reaction(s): Rash        Current Outpatient Medications   Medication Instructions    clobetasoL (TEMOVATE) 0.05 % cream SMARTSI Topical Daily PRN    COSENTYX PEN, 2 PENS, 150 mg/mL PnIj Inject 300mg SQ qweek x 5 weeks then inject 300mg SQ q 4 weeks    fluticasone propionate (FLONASE) 50 mcg, Each Nostril, Daily    furosemide (LASIX) 20 MG tablet TAKE 1 TABLET BY MOUTH EVERY DAY AS NEEDED    levocetirizine (XYZAL) 5 mg, Oral, Nightly    LORazepam (ATIVAN) 0.5 mg, Oral, Every 8 hours PRN    methylPREDNISolone (MEDROL DOSEPACK) 4 mg tablet use as directed    OTEZLA 30 mg Tab TAKE 1 TABLET BY MOUTH 2 TIMES A DAY.    valACYclovir (VALTREX) 500 mg, Oral, 2 times daily, Take at first sign of symptoms of outbreak.        There were no vitals filed for this visit.     Wt Readings from Last 3 Encounters:   23 97.6 kg (215 lb 2.7 oz)   23 97.6 kg (215 lb 1.6 oz)   23 97.4 kg (214 lb 13.4 oz)     Temp Readings from Last 3 Encounters:   23 97.9 °F (36.6 °C) (Oral)   23 97 °F (36.1 °C) (Temporal)   23 97.7 °F (36.5 °C) (Temporal)     BP Readings from Last 3 Encounters:   23 124/85   23 138/74   23 115/69     Pulse Readings from Last 3 Encounters:   23 76   23 (!) 59   23 61        There is no height or weight on file to calculate BMI. Estimated body surface area is 2.27 meters squared as calculated from the following:    Height as of 23: 6' 3" (1.905 m).    Weight as of 23: 97.6 kg (215 lb 2.7 oz).     Physical Exam     Lab Results   Component Value Date     2023    K 5.0 2023     2023    CO2 28 2023    BUN 11 2023    CREATININE 0.9 2023    GLU 99 2023    AST 30 2023    ALT 27 2023    ALBUMIN 4.1 2023    PROT 6.8 2023    BILITOT 1.3 (H) 2023    WBC 6.07 2023    HGB 15.6 2023    HCT 47.2 2023 "     08/14/2023    TSH 2.640 12/27/2022    CHOL 184 09/09/2022    HDL 54 09/09/2022    LDLCALC 114.2 09/09/2022    TRIG 79 09/09/2022    Y2RWURG 5.6 12/27/2022       @LABRCNTIP(cpk,cpkmb,troponini,mb)@     No results found for this visit on 08/15/23.       Results for orders placed during the hospital encounter of 09/09/22    Echo    Interpretation Summary  · The left ventricle is normal in size with  · The visually estimated ejection fraction is 58% ( mid to upper 50s but no evidence of HCM).  · The quantitatively derived ejection fraction is 61%.  · Normal left ventricular diastolic function.  · There are segmental left ventricular wall motion abnormalities.  · Normal right ventricular size with normal right ventricular systolic function.  · The ascending aorta is mildly dilated.        No results found for this or any previous visit.         Assessment and Plan:  There are no diagnoses linked to this encounter.         Hypertrophic cardiomyopathy. LVEF 61%.  S/p alcohol ablation for obstructive phenotype.  Resolution of LVOT gradients after 2 procedures.  Residual complications: complete AV block requiring pacemaker function. Scar related VT and ICD shocks. S/p VT ablation.  Currently he is asymptomatic. Takes prn lasix for fluid retention.  Plan:       F/u in  1 year.    2. H/o VT (after septal ablation) s/p ICD. Follow up with EP.

## 2023-08-29 ENCOUNTER — CLINICAL SUPPORT (OUTPATIENT)
Dept: CARDIOLOGY | Facility: HOSPITAL | Age: 63
End: 2023-08-29
Payer: COMMERCIAL

## 2023-08-29 DIAGNOSIS — Z95.810 PRESENCE OF AUTOMATIC (IMPLANTABLE) CARDIAC DEFIBRILLATOR: ICD-10-CM

## 2023-08-29 PROCEDURE — 93296 REM INTERROG EVL PM/IDS: CPT | Performed by: STUDENT IN AN ORGANIZED HEALTH CARE EDUCATION/TRAINING PROGRAM

## 2023-11-24 ENCOUNTER — LAB VISIT (OUTPATIENT)
Dept: LAB | Facility: HOSPITAL | Age: 63
End: 2023-11-24
Attending: DERMATOLOGY
Payer: COMMERCIAL

## 2023-11-24 DIAGNOSIS — L40.4 GUTTATE PSORIASIS: ICD-10-CM

## 2023-11-24 PROCEDURE — 86480 TB TEST CELL IMMUN MEASURE: CPT | Performed by: DERMATOLOGY

## 2023-11-25 LAB
GAMMA INTERFERON BACKGROUND BLD IA-ACNC: 0.04 IU/ML
M TB IFN-G CD4+ BCKGRND COR BLD-ACNC: 0.14 IU/ML
M TB IFN-G CD4+ BCKGRND COR BLD-ACNC: 0.17 IU/ML
MITOGEN IGNF BCKGRD COR BLD-ACNC: 9.96 IU/ML
TB GOLD PLUS: NEGATIVE

## 2023-11-28 ENCOUNTER — CLINICAL SUPPORT (OUTPATIENT)
Dept: CARDIOLOGY | Facility: HOSPITAL | Age: 63
End: 2023-11-28
Payer: COMMERCIAL

## 2023-11-28 ENCOUNTER — CLINICAL SUPPORT (OUTPATIENT)
Dept: CARDIOLOGY | Facility: HOSPITAL | Age: 63
End: 2023-11-28
Attending: STUDENT IN AN ORGANIZED HEALTH CARE EDUCATION/TRAINING PROGRAM
Payer: COMMERCIAL

## 2023-11-28 DIAGNOSIS — Z95.810 PRESENCE OF AUTOMATIC (IMPLANTABLE) CARDIAC DEFIBRILLATOR: ICD-10-CM

## 2023-11-28 PROCEDURE — 93295 CARDIAC DEVICE CHECK CHECK - REMOTE ALERT: ICD-10-PCS | Mod: ,,, | Performed by: STUDENT IN AN ORGANIZED HEALTH CARE EDUCATION/TRAINING PROGRAM

## 2023-11-28 PROCEDURE — 93295 DEV INTERROG REMOTE 1/2/MLT: CPT | Mod: ,,, | Performed by: STUDENT IN AN ORGANIZED HEALTH CARE EDUCATION/TRAINING PROGRAM

## 2023-11-28 PROCEDURE — 93296 REM INTERROG EVL PM/IDS: CPT | Performed by: STUDENT IN AN ORGANIZED HEALTH CARE EDUCATION/TRAINING PROGRAM

## 2023-12-05 ENCOUNTER — PATIENT MESSAGE (OUTPATIENT)
Dept: ELECTROPHYSIOLOGY | Facility: CLINIC | Age: 63
End: 2023-12-05
Payer: COMMERCIAL

## 2023-12-05 LAB
OHS CV AF BURDEN PERCENT: < 1
OHS CV DC REMOTE DEVICE TYPE: NORMAL
OHS CV ICD SHOCK: NO
OHS CV RV PACING PERCENT: 99.8 %

## 2024-02-27 ENCOUNTER — CLINICAL SUPPORT (OUTPATIENT)
Dept: CARDIOLOGY | Facility: HOSPITAL | Age: 64
End: 2024-02-27
Payer: COMMERCIAL

## 2024-02-27 ENCOUNTER — CLINICAL SUPPORT (OUTPATIENT)
Dept: CARDIOLOGY | Facility: HOSPITAL | Age: 64
End: 2024-02-27
Attending: STUDENT IN AN ORGANIZED HEALTH CARE EDUCATION/TRAINING PROGRAM
Payer: COMMERCIAL

## 2024-02-27 DIAGNOSIS — Z95.810 PRESENCE OF AUTOMATIC (IMPLANTABLE) CARDIAC DEFIBRILLATOR: ICD-10-CM

## 2024-02-27 PROCEDURE — 93296 REM INTERROG EVL PM/IDS: CPT | Performed by: STUDENT IN AN ORGANIZED HEALTH CARE EDUCATION/TRAINING PROGRAM

## 2024-02-27 PROCEDURE — 93295 DEV INTERROG REMOTE 1/2/MLT: CPT | Mod: ,,, | Performed by: STUDENT IN AN ORGANIZED HEALTH CARE EDUCATION/TRAINING PROGRAM

## 2024-02-28 LAB
OHS CV AF BURDEN PERCENT: < 1
OHS CV DC REMOTE DEVICE TYPE: NORMAL
OHS CV ICD SHOCK: NO
OHS CV RV PACING PERCENT: 99.92 %

## 2024-03-15 ENCOUNTER — PATIENT MESSAGE (OUTPATIENT)
Dept: ELECTROPHYSIOLOGY | Facility: CLINIC | Age: 64
End: 2024-03-15
Payer: COMMERCIAL

## 2024-04-17 ENCOUNTER — HOSPITAL ENCOUNTER (OUTPATIENT)
Dept: RADIOLOGY | Facility: HOSPITAL | Age: 64
Discharge: HOME | End: 2024-04-17
Attending: NURSE PRACTITIONER
Payer: COMMERCIAL

## 2024-04-17 DIAGNOSIS — R05.3 PERSISTENT COUGH: ICD-10-CM

## 2024-04-17 PROCEDURE — 71046 X-RAY EXAM CHEST 2 VIEWS: CPT | Mod: TC,PN

## 2024-04-17 PROCEDURE — 71046 X-RAY EXAM CHEST 2 VIEWS: CPT | Mod: 26,,, | Performed by: RADIOLOGY

## 2024-05-28 ENCOUNTER — CLINICAL SUPPORT (OUTPATIENT)
Dept: CARDIOLOGY | Facility: HOSPITAL | Age: 64
End: 2024-05-28
Attending: STUDENT IN AN ORGANIZED HEALTH CARE EDUCATION/TRAINING PROGRAM
Payer: COMMERCIAL

## 2024-05-28 ENCOUNTER — CLINICAL SUPPORT (OUTPATIENT)
Dept: CARDIOLOGY | Facility: HOSPITAL | Age: 64
End: 2024-05-28
Payer: COMMERCIAL

## 2024-05-28 DIAGNOSIS — Z95.810 PRESENCE OF AUTOMATIC (IMPLANTABLE) CARDIAC DEFIBRILLATOR: ICD-10-CM

## 2024-05-28 PROCEDURE — 93296 REM INTERROG EVL PM/IDS: CPT | Performed by: STUDENT IN AN ORGANIZED HEALTH CARE EDUCATION/TRAINING PROGRAM

## 2024-05-28 PROCEDURE — 93295 DEV INTERROG REMOTE 1/2/MLT: CPT | Mod: ,,, | Performed by: STUDENT IN AN ORGANIZED HEALTH CARE EDUCATION/TRAINING PROGRAM

## 2024-05-29 LAB
OHS CV AF BURDEN PERCENT: < 1
OHS CV DC REMOTE DEVICE TYPE: NORMAL
OHS CV ICD SHOCK: NO
OHS CV RV PACING PERCENT: 99.96 %

## 2024-07-25 ENCOUNTER — TELEPHONE (OUTPATIENT)
Dept: SLEEP MEDICINE | Facility: CLINIC | Age: 64
End: 2024-07-25
Payer: COMMERCIAL

## 2024-07-25 NOTE — TELEPHONE ENCOUNTER
----- Message from Brandee Jolley MA sent at 7/24/2024  5:03 PM CDT -----  Regarding: FW: sleep supplies    ----- Message -----  From: Felicita Nguyen  Sent: 7/24/2024   4:53 PM CDT  To: Ida Saavedra Staff  Subject: sleep supplies                                   Type:  Patient Returning Call      Name of who is calling:[facility]a/ [facility]        What is request in detail:Norma is requesting a call back in regards to patients sleep supplies. Needs to know if patient needs to make and appt in order to have sleep supply order placed . If not patient needs full face with heated tubing        Can clinic reply by [facility]:        [facility]:[phone]  ext [phone]

## 2024-08-05 ENCOUNTER — TELEPHONE (OUTPATIENT)
Dept: SLEEP MEDICINE | Facility: CLINIC | Age: 64
End: 2024-08-05
Payer: COMMERCIAL

## 2024-08-27 ENCOUNTER — CLINICAL SUPPORT (OUTPATIENT)
Dept: CARDIOLOGY | Facility: HOSPITAL | Age: 64
End: 2024-08-27
Attending: STUDENT IN AN ORGANIZED HEALTH CARE EDUCATION/TRAINING PROGRAM
Payer: COMMERCIAL

## 2024-08-27 ENCOUNTER — CLINICAL SUPPORT (OUTPATIENT)
Dept: CARDIOLOGY | Facility: HOSPITAL | Age: 64
End: 2024-08-27
Payer: COMMERCIAL

## 2024-08-27 DIAGNOSIS — Z95.810 PRESENCE OF AUTOMATIC (IMPLANTABLE) CARDIAC DEFIBRILLATOR: ICD-10-CM

## 2024-08-27 PROCEDURE — 93295 DEV INTERROG REMOTE 1/2/MLT: CPT | Mod: ,,, | Performed by: STUDENT IN AN ORGANIZED HEALTH CARE EDUCATION/TRAINING PROGRAM

## 2024-08-27 PROCEDURE — 93296 REM INTERROG EVL PM/IDS: CPT | Performed by: STUDENT IN AN ORGANIZED HEALTH CARE EDUCATION/TRAINING PROGRAM

## 2024-08-29 LAB
OHS CV AF BURDEN PERCENT: < 1
OHS CV DC REMOTE DEVICE TYPE: NORMAL
OHS CV ICD SHOCK: NO
OHS CV RV PACING PERCENT: 99.97 %

## 2024-11-14 ENCOUNTER — OFFICE VISIT (OUTPATIENT)
Dept: SLEEP MEDICINE | Facility: CLINIC | Age: 64
End: 2024-11-14
Payer: COMMERCIAL

## 2024-11-14 DIAGNOSIS — G47.33 OSA (OBSTRUCTIVE SLEEP APNEA): Primary | ICD-10-CM

## 2024-11-14 PROCEDURE — 1159F MED LIST DOCD IN RCRD: CPT | Mod: CPTII,95,, | Performed by: PHYSICIAN ASSISTANT

## 2024-11-14 PROCEDURE — 1160F RVW MEDS BY RX/DR IN RCRD: CPT | Mod: CPTII,95,, | Performed by: PHYSICIAN ASSISTANT

## 2024-11-14 PROCEDURE — 99213 OFFICE O/P EST LOW 20 MIN: CPT | Mod: 95,,, | Performed by: PHYSICIAN ASSISTANT

## 2024-11-14 NOTE — PROGRESS NOTES
The chief complaint leading to consultation is: COOPER     Visit type: audiovisual     The patient location is: [place]     8 minutes of total time spent on the encounter, which includes face to face time and non-face to face time preparing to see the patient (eg, review of tests), Obtaining and/or reviewing separately obtained history, Documenting clinical information in the electronic or other health record, Independently interpreting results (not separately reported) and communicating results to the patient/family/caregiver, or Care coordination (not separately reported).      Each patient to whom he or she provides medical services by telemedicine is:  (1) informed of the relationship between the physician and patient and the respective role of any other health care provider with respect to management of the patient; and (2) notified that he or she may decline to receive medical services by telemedicine and may withdraw from such care at any time.          Referred by No ref. provider found     ESTABLISHED PATIENT VISIT    Clinton Weller  is a pleasant 64 y.o. male  with PMH significant for hypertrophic cardiomyopathy, ICD, and COOPER dx circa 2012.       Here today for: CPAP follow-up     PLAN last visit 5/16/23:   -using and benefiting from CPAP therapy  -continue CPAP 11.5cwp nightly  -CPAP supplies ordered to [facility]  -discussed COOPER and CPAP with patient in detail, including possible complications of untreated COOPER like heart attack/stroke  -advised on strict driving precautions; advised never to drive drowsy      Since last visit:   Using CPAP nightly with good control of sx. Reports mask interface and pressure settings are comfortable. States CPAP is still running smoothly. Presents today for annual visit and updated CPAP supply over. No complaints at this time.      PAP history   Problems No issues   Mask FFM   Pressure 11.5cwp   D[facility]   Machine age DS2: Circa feb/march 2022   Download N/a  (no remote access/virtual visit)  Reports nightly usage with good control of sx       Past Medical History:   Diagnosis Date    AICD (automatic cardioverter/defibrillator) present     Allergy     Sulfa drugs    Asthma 1974    Exercise-induced    Defibrillator discharge     Heart attack     Hypertrophic Cardiomyopathy    History of acne 1981    Cystic acne    Hypertrophic cardiomyopathy     Joint pain 2002    Pacemaker     Psoriasis     Psoriatic arthritis 2023    Skin disease 1980    Cystic acne     Patient Active Problem List   Diagnosis    Hypertrophic cardiomyopathy    Heterozygous MTHFR mutation U6814G    Elevated homocysteine    Implantable cardioverter-defibrillator (ICD) in situ    Coenzyme Q deficiency    AVNRT (AV neri re-entry tachycardia)    Deficit of vestibulo-ocular reflex    Panic anxiety syndrome    History of cardiac radiofrequency ablation    COOPER (obstructive sleep apnea)    Psoriasis vulgaris    Psoriatic arthritis       Current Outpatient Medications:     clobetasoL (TEMOVATE) 0.05 % cream, SMARTSI Topical Daily PRN, Disp: , Rfl:     fluticasone propionate (FLONASE) 50 mcg/actuation nasal spray, 1 spray (50 mcg total) by Each Nostril route once daily., Disp: 16 g, Rfl: 3    furosemide (LASIX) 20 MG tablet, TAKE 1 TABLET BY MOUTH EVERY DAY AS NEEDED, Disp: 90 tablet, Rfl: 2    levocetirizine (XYZAL) 5 MG tablet, Take 1 tablet (5 mg total) by mouth every evening., Disp: 30 tablet, Rfl: 11    LORazepam (ATIVAN) 0.5 MG tablet, Take 1 tablet (0.5 mg total) by mouth every 8 (eight) hours as needed., Disp: 20 tablet, Rfl: 1    valACYclovir (VALTREX) 500 MG tablet, TAKE 1 TABLET (500 MG TOTAL) BY MOUTH 2 (TWO) TIMES DAILY. TAKE AT FIRST SIGN OF SYMPTOMS OF OUTBREAK. FOR 3 DAYS, Disp: 30 tablet, Rfl: 3       There were no vitals filed for this visit.    Physical Exam:    GEN:   Well-appearing  Psych:  Appropriate affect, demonstrates insight  SKIN:  No rash on the face or bridge of  "the nose      LABS:   No results found for: "HGB", "CO2"      RECORDS REVIEWED PREVIOUSLY:    Outside records scanned into Epic:  PSG 3/2016 showed AHI 86.1  CPAP titration 12/16 showed AHI 0 at 8 cm H20    Previous sleep note: 5/16/23    ASSESSMENT      PROBLEM DESCRIPTION/ Sx on Presentation Interval Hx STATUS   COOPER   Dx circa 2012  Outside PSG 2016 AHI 86.1   Reports wearing CPAP nightly with great improvement in quality of sleep  Likely controlled    Daytime Sx   + sleepiness when inactive   ESS n/a/24 on intake Reports wearing CPAP nightly with significant improvement in daytime sleepiness  improved       PLAN     -using and benefiting from CPAP therapy  -continue CPAP 11.5cwp nightly  -CPAP supplies ordered to [facility]  -discussed COOPER and CPAP with patient in detail, including possible complications of untreated COOPER like heart attack/stroke  -advised on strict driving precautions; advised never to drive drowsy    Advised on plan of care. Answered all patient questions. Patient verbalized understanding and voiced agreement with plan of care.       RTC 12 months or as needed       The patient was given open opportunity to ask questions and/or express concerns about treatment plan.   All questions/concerns were discussed.     Two patient identifiers used prior to evaluation.               "

## 2024-11-26 ENCOUNTER — CLINICAL SUPPORT (OUTPATIENT)
Dept: CARDIOLOGY | Facility: HOSPITAL | Age: 64
End: 2024-11-26
Payer: COMMERCIAL

## 2024-11-26 ENCOUNTER — CLINICAL SUPPORT (OUTPATIENT)
Dept: CARDIOLOGY | Facility: HOSPITAL | Age: 64
End: 2024-11-26
Attending: STUDENT IN AN ORGANIZED HEALTH CARE EDUCATION/TRAINING PROGRAM
Payer: COMMERCIAL

## 2024-11-26 DIAGNOSIS — Z95.810 PRESENCE OF AUTOMATIC (IMPLANTABLE) CARDIAC DEFIBRILLATOR: ICD-10-CM

## 2024-11-26 DIAGNOSIS — I42.2 OTHER HYPERTROPHIC CARDIOMYOPATHY: ICD-10-CM

## 2024-11-26 PROCEDURE — 93295 DEV INTERROG REMOTE 1/2/MLT: CPT | Mod: ,,, | Performed by: STUDENT IN AN ORGANIZED HEALTH CARE EDUCATION/TRAINING PROGRAM

## 2024-11-26 PROCEDURE — 93296 REM INTERROG EVL PM/IDS: CPT | Performed by: STUDENT IN AN ORGANIZED HEALTH CARE EDUCATION/TRAINING PROGRAM

## 2024-11-29 DIAGNOSIS — I42.2 HYPERTROPHIC CARDIOMYOPATHY: Primary | ICD-10-CM

## 2024-12-04 LAB
OHS CV AF BURDEN PERCENT: < 1
OHS CV DC REMOTE DEVICE TYPE: NORMAL
OHS CV RV PACING PERCENT: 99.92 %

## 2025-01-10 NOTE — PROGRESS NOTES
Mr. Weller is a patient of Dr. Kwok and was last seen in clinic 1/30/2023.      Subjective:   Patient ID:  Clinton Weller is a 64 y.o. male who presents for follow up of ICD  .     HPI:    Mr. Weller is a 64 y.o. male with HOCM (alcohol septal ablations in 2005 and 2006), CHB, VT (RFA 2016), AVNRT (RFA 2014), ICD (2016), LBBB, COOPER on CPAP, anxiety here for follow up.     Background:    Mr. Clinton Weller has a past medical history significant for hypertrophic cardiomyopathy with two previous alcohol septal ablations in 2005 and 2006 at [place], complicated by postoperative complete heart block, a history of VT s/p implantation of a secondary prevention Medtronic dual-chamber ICD 3/6/2006 with generator change on 9/17/2018, a history of AVNRT s/p slow pathway modification in 2014, a history of MMVT with ineffective shocks, s/p successful RFA ablation of the LV septum 7/12/2016 at [place] with postablative DFTs effective at 20J, chronic underlying LBBB, COOPER maintained on CPAP therapy, anxiety, and overweight BMI.     He recently moved to [place] from [place], [place]. He was previously followed with [place] with Dr. Anibal López - full records have been scanned into his media tab for review. He tells me that since moving to [place], he has been doing overall quite well. He occasionally reports episodes of heart racing, most notably with the onset of exercise - he states that his device was made rate responsive at a prior interrogation and that following this reprogramming amrita bowen that these symptoms have improved. He continues to be very physically active, doing resistance training, cardio, and light strength exercises 3-4 days per week. He has a history of a prior arrest that occurred in 2016 with exercise - this was noted to be a VT arrest with failed shocks in 6/2016, followed by a successful VT ablation and repeat DFTs that were within normal parameters. He has not had any  subsequent VT/VF recorded on device interrogations since this event. He reports that he continues to work on his anxiety.     In discussion with Mr. Weller today, he tells me that he is feeling overall quite well. He denies any episodes of dizziness, lightheadedness, syncope/presyncope, chest pain or chest discomfort, palpitations other than those noted with exercise initiation and termination that likely correspond to rate response, nausea or vomiting, orthopnea, lower extremity edema, or PND. He reports mild baseline shortness of breath and dyspnea with exertion that he feels has remained stable. He can climb more than one flight of stairs prior to needing to take a break and continues to attempt to increase his level of physical activity as above.   Mr. Clinton Weller is a daksha 62-year-old gentleman who presents today to establish care in the [facility] EP clinic for ongoing evaluation and recommendations regarding a history of mid-apical hypertrophic cardiomyopathy with prior VT, s/p implantation of a secondary prevention ICD. He has a past medical history significant for hypertrophic cardiomyopathy with two previous alcohol septal ablations in 2005 and 2006 at [place], complicated by postoperative complete heart block, a history of VT s/p implantation of a secondary prevention Medtronic dual-chamber ICD 3/6/2006 with generator change on 9/17/2018, a history of AVNRT s/p slow pathway modification in 2014, a history of MMVT with ineffective shocks, s/p successful RFA ablation of the LV septum 7/12/2016 at [place] with postablative DFTs effective at 20J, chronic underlying LBBB, COOPER maintained on CPAP therapy, anxiety, and overweight BMI.     - He has a normally functioning dual-chamber ICD on device interrogation today with underlying sinus bradycardia with complete heart block. He has a paced QRSd currently of 184ms. His recent echocardiogram demonstrated an LVEF of 58%. He has approximately 3.1 years  "remaining prior to requiring a generator change. We may consider implantation of a coronary sinus lead at his generator change.  - He has not had any episodes of VT/VF on this current generator since implantation in 2018. His prior VT episodes occurred in 2016, with no evidence of recurrence since his successful VT ablation. He remains off of antiarrhythmic therapy.   - He will continue to have Holter monitor assessment as per the Advanced Heart Failure service, with no evidence of any atrial or ventricular arrhythmias on device interrogation today.  - We will transfer his remote monitoring into our system. He will continue to send remote transmission q3 months, with his next in-office interrogation in six months.   - He will continue to follow closely with his PCP, general cardiologist, and his Advanced Heart Failure team for continued management of his chronic medical conditions.         Update (01/13/2025):    Today he says he has been feeling at baseline. Will have some SOB in the AM relieved with PRN lasix. He is using this more than before. He is LH "all the time." No CP, palps, syncope reported.  On CPAP.    Device Interrogation (1/13/2025) reveals an intrinsic SR with CHB with stable lead and device function.Atrial arrhythmias: none. Ventricular arrhythmias: "NSVT" x 2; max duration on 3/26/24 x 2 sec c/w NSAT; most recent on 7/19/24 x 1 sec c/w probable NEHEMIAS, no egram avail. No treated episodes.  He paces 68.2% in the RA and 99.9% in the RV. Estimated battery longevity 2 years.     I have personally reviewed the patient's EKG today, which shows APVP at 66bpm. WA interval is 306. QRS is 182. QTc is 505.    Relevant Cardiac Test Results:    2D Echo (3/18/2024):  LV size is normal. There is mild basal septal hypertrophy.   Normal average LV global longitudinal strain at -20.7%. LV. EF is normal. Overall wall motion is normal. Septal motion is paradoxical secondary to LBBB or conduction abnormality. Estimated EF " is 55-59%.   RV size is normal. A pacemaker wire is seen in the RV. RV systolic function is normal. LA volume is mild to moderately enlarged.  RA volume is normal. A pacemaker wire is seen.   Aortic root diameter is upper limits of normal in size.   No pericardial effusion.   No structural AV abnormalities noted.   No structural MV abnormalities noted. Mild mitral regurgitation.   No structural PV abnormalities noted. A trace of pulmonic regurgitation.   No structural TV abnormalities noted. Mild tricuspid regurgitation. Left ventricular outflow tract dynamic obstruction is not present at rest or with Valsalva.   Diastolic dysfunction Grade I (Mild): Impaired relaxation with normal LV filling pressures. Pulmonary vein velocity  pattern indicative of elevated LVEDP.   Estimated PA systolic pressure is 23 mmHg, assuming a mean RAP of 5 mmHg.     Current Outpatient Medications   Medication Sig    clobetasoL (TEMOVATE) 0.05 % cream SMARTSI Topical Daily PRN    fluticasone propionate (FLONASE) 50 mcg/actuation nasal spray 1 spray (50 mcg total) by Each Nostril route once daily.    furosemide (LASIX) 20 MG tablet TAKE 1 TABLET BY MOUTH EVERY DAY AS NEEDED    levocetirizine (XYZAL) 5 MG tablet Take 1 tablet (5 mg total) by mouth every evening.    LORazepam (ATIVAN) 0.5 MG tablet Take 1 tablet (0.5 mg total) by mouth every 8 (eight) hours as needed.    valACYclovir (VALTREX) 500 MG tablet TAKE 1 TABLET (500 MG TOTAL) BY MOUTH 2 (TWO) TIMES DAILY. TAKE AT FIRST SIGN OF SYMPTOMS OF OUTBREAK. FOR 3 DAYS     No current facility-administered medications for this visit.     Review of Systems   Constitutional: Negative for malaise/fatigue.   Cardiovascular:  Negative for chest pain, dyspnea on exertion, irregular heartbeat, leg swelling and palpitations.   Respiratory:  Positive for shortness of breath (in AM, treated successfully with lasix).    Hematologic/Lymphatic: Negative for bleeding problem.   Skin:  Negative for rash.  "  Musculoskeletal:  Negative for myalgias.   Gastrointestinal:  Negative for hematemesis, hematochezia and nausea.   Genitourinary:  Negative for hematuria.   Neurological:  Positive for light-headedness.   Psychiatric/Behavioral:  Negative for altered mental status.    Allergic/Immunologic: Negative for persistent infections.     Objective:          /80   Pulse 66   Ht 6' 3" (1.905 m)   Wt 101.1 kg (222 lb 14.2 oz)   BMI 27.86 kg/m²     Physical Exam  Vitals and nursing note reviewed.   Constitutional:       Appearance: Normal appearance. He is well-developed.   HENT:      Head: Normocephalic.      Nose: Nose normal.   Eyes:      Pupils: Pupils are equal, round, and reactive to light.   Cardiovascular:      Rate and Rhythm: Normal rate and regular rhythm.   Pulmonary:      Effort: No respiratory distress.   Chest:      Comments: Device to LUCW. Incision and pocket in good repair.    Musculoskeletal:         General: Normal range of motion.   Skin:     General: Skin is warm and dry.      Findings: No erythema.   Neurological:      Mental Status: He is alert and oriented to person, place, and time.   Psychiatric:         Speech: Speech normal.         Behavior: Behavior normal.           Lab Results   Component Value Date     08/14/2023    K 5.0 08/14/2023    BUN 11 08/14/2023    CREATININE 0.9 08/14/2023    ALT 27 08/14/2023    AST 30 08/14/2023    HGB 15.6 08/14/2023    HCT 47.2 08/14/2023    TSH 2.640 12/27/2022    LDLCALC 114.2 09/09/2022             Assessment:     1. Implantable cardioverter-defibrillator (ICD) in situ    2. AVNRT (AV neri re-entry tachycardia)    3. Hypertrophic cardiomyopathy    4. COOPER (obstructive sleep apnea)    5. Chronic heart failure, unspecified heart failure type      Plan:     In summary, Mr. Weller is a 64 y.o. male with HOCM (alcohol septal ablations in 2005 and 2006), CHB, VT (RFA 2016), AVNRT (RFA 2014), ICD (2016), LBBB, COOPER on CPAP, anxiety here for follow up. "   Mr. Weller is doing well from a device perspective with stable lead and device function. No sustained arrhythmia noted. CHB with 100% RV pacing. Mild SOB treated with PRN lasix. Echo 3/2024 showed preserved LV function. Repeat echo scheduled for March 17,2025. Discussed possible CRT upgrade at the time of gen change if needed.    Echo scheduled for March 17, 2025.   Continue current medication regimen and device settings.   Follow up in device clinic as scheduled.   Follow up in EP clinic in 1 year, sooner as needed.     *A copy of this note has been sent to Dr. Kwok*    Follow up in about 1 year (around 1/13/2026).      ------------------------------------------------------------------    MARCI Esparza, NP-C  Cardiac Electrophysiology

## 2025-01-13 ENCOUNTER — CLINICAL SUPPORT (OUTPATIENT)
Dept: CARDIOLOGY | Facility: HOSPITAL | Age: 65
End: 2025-01-13
Attending: STUDENT IN AN ORGANIZED HEALTH CARE EDUCATION/TRAINING PROGRAM
Payer: COMMERCIAL

## 2025-01-13 ENCOUNTER — HOSPITAL ENCOUNTER (OUTPATIENT)
Dept: CARDIOLOGY | Facility: CLINIC | Age: 65
Discharge: HOME | End: 2025-01-13
Payer: COMMERCIAL

## 2025-01-13 ENCOUNTER — OFFICE VISIT (OUTPATIENT)
Dept: ELECTROPHYSIOLOGY | Facility: CLINIC | Age: 65
End: 2025-01-13
Payer: COMMERCIAL

## 2025-01-13 VITALS
HEIGHT: 75 IN | HEART RATE: 66 BPM | DIASTOLIC BLOOD PRESSURE: 80 MMHG | WEIGHT: 222.88 LBS | SYSTOLIC BLOOD PRESSURE: 136 MMHG | BODY MASS INDEX: 27.71 KG/M2

## 2025-01-13 DIAGNOSIS — I50.9 CHRONIC HEART FAILURE, UNSPECIFIED HEART FAILURE TYPE: ICD-10-CM

## 2025-01-13 DIAGNOSIS — Z95.810 IMPLANTABLE CARDIOVERTER-DEFIBRILLATOR (ICD) IN SITU: Primary | ICD-10-CM

## 2025-01-13 DIAGNOSIS — G47.33 OSA (OBSTRUCTIVE SLEEP APNEA): ICD-10-CM

## 2025-01-13 DIAGNOSIS — I47.19 AVNRT (AV NODAL RE-ENTRY TACHYCARDIA): ICD-10-CM

## 2025-01-13 DIAGNOSIS — I42.2 HYPERTROPHIC CARDIOMYOPATHY: ICD-10-CM

## 2025-01-13 LAB
OHS QRS DURATION: 182 MS
OHS QTC CALCULATION: 505 MS

## 2025-01-13 PROCEDURE — 93280 PM DEVICE PROGR EVAL DUAL: CPT | Mod: 26,,, | Performed by: STUDENT IN AN ORGANIZED HEALTH CARE EDUCATION/TRAINING PROGRAM

## 2025-01-13 PROCEDURE — 93280 PM DEVICE PROGR EVAL DUAL: CPT

## 2025-01-13 PROCEDURE — 3008F BODY MASS INDEX DOCD: CPT | Mod: CPTII,S$GLB,, | Performed by: NURSE PRACTITIONER

## 2025-01-13 PROCEDURE — 3079F DIAST BP 80-89 MM HG: CPT | Mod: CPTII,S$GLB,, | Performed by: NURSE PRACTITIONER

## 2025-01-13 PROCEDURE — 1160F RVW MEDS BY RX/DR IN RCRD: CPT | Mod: CPTII,S$GLB,, | Performed by: NURSE PRACTITIONER

## 2025-01-13 PROCEDURE — 1159F MED LIST DOCD IN RCRD: CPT | Mod: CPTII,S$GLB,, | Performed by: NURSE PRACTITIONER

## 2025-01-13 PROCEDURE — 93005 ELECTROCARDIOGRAM TRACING: CPT | Mod: XE,S$GLB,, | Performed by: STUDENT IN AN ORGANIZED HEALTH CARE EDUCATION/TRAINING PROGRAM

## 2025-01-13 PROCEDURE — 99999 PR PBB SHADOW E&M-EST. PATIENT-LVL III: CPT | Mod: PBBFAC,,, | Performed by: NURSE PRACTITIONER

## 2025-01-13 PROCEDURE — 99214 OFFICE O/P EST MOD 30 MIN: CPT | Mod: S$GLB,,, | Performed by: NURSE PRACTITIONER

## 2025-01-13 PROCEDURE — 3075F SYST BP GE 130 - 139MM HG: CPT | Mod: CPTII,S$GLB,, | Performed by: NURSE PRACTITIONER

## 2025-01-13 PROCEDURE — 93010 ELECTROCARDIOGRAM REPORT: CPT | Mod: S$GLB,,, | Performed by: INTERNAL MEDICINE

## 2025-01-13 RX ORDER — TRIAMCINOLONE ACETONIDE 1 MG/G
CREAM TOPICAL
COMMUNITY
Start: 2025-01-08 | End: 2026-01-08

## 2025-01-24 LAB
OHS CV DC REMOTE DEVICE TYPE: NORMAL
OHS CV RV PACING PERCENT: 99.9 %

## 2025-02-25 ENCOUNTER — CLINICAL SUPPORT (OUTPATIENT)
Dept: CARDIOLOGY | Facility: HOSPITAL | Age: 65
End: 2025-02-25

## 2025-02-25 ENCOUNTER — CLINICAL SUPPORT (OUTPATIENT)
Dept: CARDIOLOGY | Facility: HOSPITAL | Age: 65
End: 2025-02-25
Attending: STUDENT IN AN ORGANIZED HEALTH CARE EDUCATION/TRAINING PROGRAM
Payer: COMMERCIAL

## 2025-02-25 DIAGNOSIS — Z95.810 PRESENCE OF AUTOMATIC (IMPLANTABLE) CARDIAC DEFIBRILLATOR: ICD-10-CM

## 2025-02-25 DIAGNOSIS — I42.2 OTHER HYPERTROPHIC CARDIOMYOPATHY: ICD-10-CM

## 2025-02-25 PROCEDURE — 93295 DEV INTERROG REMOTE 1/2/MLT: CPT | Mod: ,,, | Performed by: STUDENT IN AN ORGANIZED HEALTH CARE EDUCATION/TRAINING PROGRAM

## 2025-02-25 PROCEDURE — 93296 REM INTERROG EVL PM/IDS: CPT | Performed by: STUDENT IN AN ORGANIZED HEALTH CARE EDUCATION/TRAINING PROGRAM

## 2025-03-02 LAB
OHS CV AF BURDEN PERCENT: < 1
OHS CV DC REMOTE DEVICE TYPE: NORMAL
OHS CV RV PACING PERCENT: 99.97 %

## 2025-03-21 ENCOUNTER — PATIENT OUTREACH (OUTPATIENT)
Dept: ADMINISTRATIVE | Facility: HOSPITAL | Age: 65
End: 2025-03-21
Payer: COMMERCIAL

## 2025-05-27 ENCOUNTER — CLINICAL SUPPORT (OUTPATIENT)
Dept: CARDIOLOGY | Facility: HOSPITAL | Age: 65
End: 2025-05-27
Attending: STUDENT IN AN ORGANIZED HEALTH CARE EDUCATION/TRAINING PROGRAM
Payer: COMMERCIAL

## 2025-05-27 ENCOUNTER — CLINICAL SUPPORT (OUTPATIENT)
Dept: CARDIOLOGY | Facility: HOSPITAL | Age: 65
End: 2025-05-27
Payer: COMMERCIAL

## 2025-05-27 DIAGNOSIS — Z95.810 PRESENCE OF AUTOMATIC (IMPLANTABLE) CARDIAC DEFIBRILLATOR: ICD-10-CM

## 2025-05-27 DIAGNOSIS — I42.2 OTHER HYPERTROPHIC CARDIOMYOPATHY: ICD-10-CM

## 2025-05-27 PROCEDURE — 93296 REM INTERROG EVL PM/IDS: CPT | Performed by: STUDENT IN AN ORGANIZED HEALTH CARE EDUCATION/TRAINING PROGRAM

## 2025-05-27 PROCEDURE — 93295 DEV INTERROG REMOTE 1/2/MLT: CPT | Mod: ,,, | Performed by: STUDENT IN AN ORGANIZED HEALTH CARE EDUCATION/TRAINING PROGRAM

## 2025-06-21 LAB
OHS CV AF BURDEN PERCENT: < 1
OHS CV DC REMOTE DEVICE TYPE: NORMAL
OHS CV RV PACING PERCENT: 99.94 %

## 2025-09-02 ENCOUNTER — TELEPHONE (OUTPATIENT)
Dept: SLEEP MEDICINE | Facility: CLINIC | Age: 65
End: 2025-09-02

## 2025-09-02 ENCOUNTER — OFFICE VISIT (OUTPATIENT)
Dept: SLEEP MEDICINE | Facility: CLINIC | Age: 65
End: 2025-09-02
Payer: COMMERCIAL

## 2025-09-02 VITALS
WEIGHT: 222.88 LBS | SYSTOLIC BLOOD PRESSURE: 123 MMHG | HEIGHT: 75 IN | DIASTOLIC BLOOD PRESSURE: 71 MMHG | HEART RATE: 69 BPM | BODY MASS INDEX: 27.71 KG/M2

## 2025-09-02 DIAGNOSIS — R40.0 DAYTIME SLEEPINESS: ICD-10-CM

## 2025-09-02 DIAGNOSIS — G47.33 OSA (OBSTRUCTIVE SLEEP APNEA): Primary | ICD-10-CM

## 2025-09-02 PROCEDURE — 1159F MED LIST DOCD IN RCRD: CPT | Mod: CPTII,S$GLB,, | Performed by: PHYSICIAN ASSISTANT

## 2025-09-02 PROCEDURE — 3008F BODY MASS INDEX DOCD: CPT | Mod: CPTII,S$GLB,, | Performed by: PHYSICIAN ASSISTANT

## 2025-09-02 PROCEDURE — 3074F SYST BP LT 130 MM HG: CPT | Mod: CPTII,S$GLB,, | Performed by: PHYSICIAN ASSISTANT

## 2025-09-02 PROCEDURE — 99999 PR PBB SHADOW E&M-EST. PATIENT-LVL III: CPT | Mod: PBBFAC,,, | Performed by: PHYSICIAN ASSISTANT

## 2025-09-02 PROCEDURE — 1160F RVW MEDS BY RX/DR IN RCRD: CPT | Mod: CPTII,S$GLB,, | Performed by: PHYSICIAN ASSISTANT

## 2025-09-02 PROCEDURE — 3078F DIAST BP <80 MM HG: CPT | Mod: CPTII,S$GLB,, | Performed by: PHYSICIAN ASSISTANT

## 2025-09-02 PROCEDURE — 99214 OFFICE O/P EST MOD 30 MIN: CPT | Mod: S$GLB,,, | Performed by: PHYSICIAN ASSISTANT
